# Patient Record
Sex: FEMALE | Race: OTHER | HISPANIC OR LATINO | URBAN - METROPOLITAN AREA
[De-identification: names, ages, dates, MRNs, and addresses within clinical notes are randomized per-mention and may not be internally consistent; named-entity substitution may affect disease eponyms.]

---

## 2021-12-13 ENCOUNTER — INPATIENT (INPATIENT)
Facility: HOSPITAL | Age: 56
LOS: 2 days | Discharge: HOME | End: 2021-12-16
Attending: STUDENT IN AN ORGANIZED HEALTH CARE EDUCATION/TRAINING PROGRAM | Admitting: STUDENT IN AN ORGANIZED HEALTH CARE EDUCATION/TRAINING PROGRAM
Payer: MEDICAID

## 2021-12-13 VITALS
RESPIRATION RATE: 18 BRPM | OXYGEN SATURATION: 99 % | TEMPERATURE: 98 F | SYSTOLIC BLOOD PRESSURE: 131 MMHG | DIASTOLIC BLOOD PRESSURE: 62 MMHG | HEART RATE: 88 BPM

## 2021-12-13 LAB — SARS-COV-2 RNA SPEC QL NAA+PROBE: SIGNIFICANT CHANGE UP

## 2021-12-13 PROCEDURE — 71046 X-RAY EXAM CHEST 2 VIEWS: CPT | Mod: 26

## 2021-12-13 PROCEDURE — 93010 ELECTROCARDIOGRAM REPORT: CPT

## 2021-12-13 PROCEDURE — 99220: CPT

## 2021-12-13 RX ORDER — ACETAMINOPHEN 500 MG
650 TABLET ORAL ONCE
Refills: 0 | Status: COMPLETED | OUTPATIENT
Start: 2021-12-13 | End: 2021-12-13

## 2021-12-13 RX ORDER — ASPIRIN/CALCIUM CARB/MAGNESIUM 324 MG
324 TABLET ORAL ONCE
Refills: 0 | Status: COMPLETED | OUTPATIENT
Start: 2021-12-13 | End: 2021-12-13

## 2021-12-13 RX ORDER — SODIUM CHLORIDE 9 MG/ML
1000 INJECTION INTRAMUSCULAR; INTRAVENOUS; SUBCUTANEOUS ONCE
Refills: 0 | Status: COMPLETED | OUTPATIENT
Start: 2021-12-13 | End: 2021-12-13

## 2021-12-13 RX ADMIN — Medication 324 MILLIGRAM(S): at 20:23

## 2021-12-13 RX ADMIN — Medication 650 MILLIGRAM(S): at 20:24

## 2021-12-13 RX ADMIN — SODIUM CHLORIDE 1000 MILLILITER(S): 9 INJECTION INTRAMUSCULAR; INTRAVENOUS; SUBCUTANEOUS at 21:25

## 2021-12-13 NOTE — ED PROVIDER NOTE - NSICDXFAMILYHX_GEN_ALL_CORE_FT
FAMILY HISTORY:  Father  Still living? Unknown  Family history of coronary artery disease, Age at diagnosis: 71-80

## 2021-12-13 NOTE — ED PROVIDER NOTE - ATTENDING CONTRIBUTION TO CARE
Declines , uses son at bedside.  56yoF with h/o HTN, HLD, migraines, presents with multiple complaints. Chief complaint is that she has been feeling her heart beating fast x 2-3 days, not currently. Some mild SOB this AM x2 hrs while at rest, not currently. Initially son was reporting CP but pt denies having had this at all. Also having generalized HA, currently only mild and entirely c/w her past migraines. On ROS some leg cramping but is chronic, some heartburn feeling in her epigastric region when she takes pills which is chronic, nausea. Nonsmoker. +FHx of CAD, has not had recent cardiac w/u. Denies vision changes, fever, cough, vomiting, urinary symptoms, and all other symptoms. On exam, afebrile, hemodynamically stable, saturating well on RA, NAD, well appearing, sitting comfortably in chair, no WOB, speaking full sentences, head NCAT, EOMI grossly, anicteric, MMM, no JVD, RRR, nml S1/S2, no m/r/g, lungs CTAB, no w/r/r, abd soft, NT, ND, nml BS, no rebound or guarding, AAO, CN's 3-12 intact, motor 5/5 and sens symm in all extrems, ACUNA spontaneously, no leg cyanosis or edema, 2+ symm radial pulses, skin warm, well perfused, no rashes or hives. Character low suspicion for dissection and no murmur or pulse asymmetry. Character low suspicion for PE and no tachycardia, hypoxia, or e/o DVT and D-dimer ________________. No e/o PNA, PTX, or fluid overload on exam or CXR. Character low suspicion for ACS and ECG/trop unremarkable. No abd pain/tenderness. No urinary symptoms. HA currently mild and c/w past migraines. No e/o elevated ICP, glaucoma, temporal arteritis, meningitis. No focal or localizing findings to suggest CVA or dissection. Declines , uses son at bedside.  56yoF with h/o HTN, HLD, migraines, presents with multiple complaints. Chief complaint is that she has been feeling her heart beating fast x 2-3 days, not currently. Some mild SOB this AM x2 hrs while at rest, not currently. Initially son was reporting CP but pt denies having had this at all. Also having generalized HA, currently only mild and entirely c/w her past migraines. On ROS some leg cramping but is chronic, some heartburn feeling in her epigastric region when she takes pills which is chronic, nausea. Nonsmoker. +FHx of CAD, has not had recent cardiac w/u. Denies vision changes, fever, cough, vomiting, urinary symptoms, and all other symptoms. On exam, afebrile, hemodynamically stable, saturating well on RA, NAD, well appearing, sitting comfortably in chair, no WOB, speaking full sentences, head NCAT, EOMI grossly, anicteric, MMM, no JVD, RRR, nml S1/S2, no m/r/g, lungs CTAB, no w/r/r, abd soft, NT, ND, nml BS, no rebound or guarding, AAO, CN's 3-12 intact, motor 5/5 and sens symm in all extrems, ACUNA spontaneously, no leg cyanosis or edema, 2+ symm radial pulses, skin warm, well perfused, no rashes or hives. Character low suspicion for dissection and no murmur or pulse asymmetry. Character low suspicion for PE and no tachycardia, hypoxia, or e/o DVT and D-dimer negative. No e/o PNA, PTX, or fluid overload on exam or CXR. Character low suspicion for ACS and ECG/trop unremarkable. No abd pain/tenderness. No urinary symptoms. HA currently mild and c/w past migraines. No e/o elevated ICP, glaucoma, temporal arteritis, meningitis. No focal or localizing findings to suggest CVA or dissection. Patient is well appearing, NAD, afebrile, hemodynamically stable. Given ASA, fluids. Sent to obs for ACS w/u.

## 2021-12-13 NOTE — ED PROVIDER NOTE - OBJECTIVE STATEMENT
57 yo female hx of HTN/HLD present c/o left sided chest pain radiating to her back/left arm off/on today with diaphoresis/sob and generalized weakness earlier today so she came to ED for evaluation. also reported posterior headache which is mild. headache is aching like. Patient just arrived from [place] about a week ago. denies hx of cardiac testing.   Denies exogenous hormone use/recent hospitalization/hx of DVT. denies recent illness/fever/chill/HA/dizziness/sob/abd pain/n/v/d/urinary sxs.

## 2021-12-13 NOTE — ED ADULT NURSE NOTE - CHPI ED NUR SYMPTOMS POS
headache/NAUSEA/PALPITATIONS/SHORTNESS OF BREATH headache; weakness; diaphoresis/CHEST PAIN/SHORTNESS OF BREATH

## 2021-12-13 NOTE — ED ADULT NURSE NOTE - OBJECTIVE STATEMENT
Pt. presents to ED with headache, feeling of heart racing fast x2-3 days & SOB this AM x2 hours. Denies chest pain, heart racing or SOB at this time. States headache consistent with previous migraines. Pt. presents to ED with chest pain radiating down left arm & back with associated SOB, diaphoresis and weakness. Also c/o headache but states c/t previous migraines.

## 2021-12-13 NOTE — ED ADULT NURSE NOTE - CHPI ED NUR SYMPTOMS NEG
no back pain/no chest pain/no chills/no congestion/no dizziness/no fever/no syncope/no vomiting no chills/no congestion/no dizziness/no fever/no syncope/no vomiting

## 2021-12-13 NOTE — ED PROVIDER NOTE - CHILD ABUSE FACILITY
Patient called writer with a few questions. Patient had questions regarding her coming on her menses on whether the surgery will be canceled. Writer informed patient surgery will not be cancelled however she can inform the SDS RN of her mesning and she can apply 2 maxi pads that sits from the lower pelvic area to the buttocks to ensure she does not mess herself. Patient voiced verbal understanding    Writer also discussed with patient what time to arrive and were to check in at after patient asked what time to arrive.     Writer discussed if patient is being dropped off to be dropped of at the main entrance of the hospital    Writer also clarified patient will be staying overnight for observation. Patient verbalized understanding   [facility]

## 2021-12-13 NOTE — ED PROVIDER NOTE - CLINICAL SUMMARY MEDICAL DECISION MAKING FREE TEXT BOX
Declines , uses son at bedside.  56yoF with h/o HTN, HLD, migraines, presents with multiple complaints. Chief complaint is that she has been feeling her heart beating fast x 2-3 days, not currently. Some mild SOB this AM x2 hrs while at rest, not currently. Initially son was reporting CP but pt denies having had this at all. Also having generalized HA, currently only mild and entirely c/w her past migraines. On ROS some leg cramping but is chronic, some heartburn feeling in her epigastric region when she takes pills which is chronic, nausea. Nonsmoker. +FHx of CAD, has not had recent cardiac w/u. Denies vision changes, fever, cough, vomiting, urinary symptoms, and all other symptoms. On exam, afebrile, hemodynamically stable, saturating well on RA, NAD, well appearing, sitting comfortably in chair, no WOB, speaking full sentences, head NCAT, EOMI grossly, anicteric, MMM, no JVD, RRR, nml S1/S2, no m/r/g, lungs CTAB, no w/r/r, abd soft, NT, ND, nml BS, no rebound or guarding, AAO, CN's 3-12 intact, motor 5/5 and sens symm in all extrems, ACUNA spontaneously, no leg cyanosis or edema, 2+ symm radial pulses, skin warm, well perfused, no rashes or hives. Character low suspicion for dissection and no murmur or pulse asymmetry. Character low suspicion for PE and no tachycardia, hypoxia, or e/o DVT and D-dimer negative. No e/o PNA, PTX, or fluid overload on exam or CXR. Character low suspicion for ACS and ECG/trop unremarkable. No abd pain/tenderness. No urinary symptoms. HA currently mild and c/w past migraines. No e/o elevated ICP, glaucoma, temporal arteritis, meningitis. No focal or localizing findings to suggest CVA or dissection. Patient is well appearing, NAD, afebrile, hemodynamically stable. Given ASA, fluids. Sent to obs for ACS w/u.

## 2021-12-13 NOTE — ED ADULT NURSE NOTE - NSFALLRSKOUTCOME_ED_ALL_ED
Gestational age 37 or more weeks  Gestational Diabetes  Herniated nucleus pulposus    PCOS (polycystic ovarian syndrome)
Universal Safety Interventions

## 2021-12-14 LAB — TROPONIN T SERPL-MCNC: <0.01 NG/ML — SIGNIFICANT CHANGE UP

## 2021-12-14 PROCEDURE — G1004: CPT

## 2021-12-14 PROCEDURE — 75574 CT ANGIO HRT W/3D IMAGE: CPT | Mod: 26,MF

## 2021-12-14 PROCEDURE — 93010 ELECTROCARDIOGRAM REPORT: CPT

## 2021-12-14 PROCEDURE — 99217: CPT

## 2021-12-14 RX ORDER — ASPIRIN/CALCIUM CARB/MAGNESIUM 324 MG
81 TABLET ORAL DAILY
Refills: 0 | Status: DISCONTINUED | OUTPATIENT
Start: 2021-12-14 | End: 2021-12-16

## 2021-12-14 RX ORDER — ATORVASTATIN CALCIUM 80 MG/1
40 TABLET, FILM COATED ORAL AT BEDTIME
Refills: 0 | Status: DISCONTINUED | OUTPATIENT
Start: 2021-12-14 | End: 2021-12-14

## 2021-12-14 RX ORDER — METOPROLOL TARTRATE 50 MG
100 TABLET ORAL ONCE
Refills: 0 | Status: COMPLETED | OUTPATIENT
Start: 2021-12-14 | End: 2021-12-14

## 2021-12-14 RX ORDER — LOSARTAN POTASSIUM 100 MG/1
50 TABLET, FILM COATED ORAL DAILY
Refills: 0 | Status: DISCONTINUED | OUTPATIENT
Start: 2021-12-14 | End: 2021-12-16

## 2021-12-14 RX ORDER — CHLORHEXIDINE GLUCONATE 213 G/1000ML
1 SOLUTION TOPICAL
Refills: 0 | Status: DISCONTINUED | OUTPATIENT
Start: 2021-12-14 | End: 2021-12-16

## 2021-12-14 RX ORDER — METOPROLOL TARTRATE 50 MG
12.5 TABLET ORAL
Refills: 0 | Status: DISCONTINUED | OUTPATIENT
Start: 2021-12-14 | End: 2021-12-16

## 2021-12-14 RX ORDER — SODIUM CHLORIDE 9 MG/ML
1000 INJECTION, SOLUTION INTRAVENOUS ONCE
Refills: 0 | Status: COMPLETED | OUTPATIENT
Start: 2021-12-14 | End: 2021-12-14

## 2021-12-14 RX ORDER — INFLUENZA VIRUS VACCINE 15; 15; 15; 15 UG/.5ML; UG/.5ML; UG/.5ML; UG/.5ML
0.5 SUSPENSION INTRAMUSCULAR ONCE
Refills: 0 | Status: DISCONTINUED | OUTPATIENT
Start: 2021-12-14 | End: 2021-12-16

## 2021-12-14 RX ORDER — METOPROLOL TARTRATE 50 MG
50 TABLET ORAL ONCE
Refills: 0 | Status: DISCONTINUED | OUTPATIENT
Start: 2021-12-14 | End: 2021-12-14

## 2021-12-14 RX ORDER — ENOXAPARIN SODIUM 100 MG/ML
40 INJECTION SUBCUTANEOUS AT BEDTIME
Refills: 0 | Status: DISCONTINUED | OUTPATIENT
Start: 2021-12-14 | End: 2021-12-15

## 2021-12-14 RX ORDER — ATORVASTATIN CALCIUM 80 MG/1
80 TABLET, FILM COATED ORAL AT BEDTIME
Refills: 0 | Status: DISCONTINUED | OUTPATIENT
Start: 2021-12-14 | End: 2021-12-16

## 2021-12-14 RX ADMIN — ENOXAPARIN SODIUM 40 MILLIGRAM(S): 100 INJECTION SUBCUTANEOUS at 21:42

## 2021-12-14 RX ADMIN — Medication 81 MILLIGRAM(S): at 17:20

## 2021-12-14 RX ADMIN — SODIUM CHLORIDE 1000 MILLILITER(S): 9 INJECTION, SOLUTION INTRAVENOUS at 06:46

## 2021-12-14 RX ADMIN — Medication 100 MILLIGRAM(S): at 08:48

## 2021-12-14 RX ADMIN — ATORVASTATIN CALCIUM 80 MILLIGRAM(S): 80 TABLET, FILM COATED ORAL at 21:42

## 2021-12-14 NOTE — ED CDU PROVIDER INITIAL DAY NOTE - OBJECTIVE STATEMENT
55 yo F with PMHx of HTN and HLD presents to the ED c/o mild left sided chest pain that started earlier today. Pain is intermittent, sharp, radiates into her back/left arm and is associated with SOB/diaphoresis/generalized weakness/mild posterior headache. Pt recently came from [place] about a week ago. She denies other complaints. She has never had cardiac workup. She is non-smoker. Pt denies fever, chills, nausea, vomiting, abdominal pain, diarrhea, dizziness, back pain, LOC, trauma, urinary symptoms, cough, calf pain/swelling, recent surgery.

## 2021-12-14 NOTE — ED CDU PROVIDER SUBSEQUENT DAY NOTE - PROGRESS NOTE DETAILS
Pt resting comfortably, denies any complaints. Will continue to monitor. CCTA shows CAD RADS 3/4a, called cardiology, would like to admit to cardio tele

## 2021-12-14 NOTE — H&P ADULT - HISTORY OF PRESENT ILLNESS
57 yo Mongolian speaking F with PMHx of HLD and HTN presents to the ED for chest pain. She is from [place] and all her doctors are in [place]. yesterday she was lying down when she felt palpitations that lasted two hours. She has had these palpitations before, was seen by a cardiologist who checked her EKG and lipid profile and started her on medications. Yesterday the palpitations were associated with lightheadedness and midsternal chest pain. Pain did not radiate anywhere and resolved within two hours. Pain is worse with respiration when she takes a deep breath.   She denies any fevers, URI symptoms, LE swelling, h/o cardiac cath or any workup.    Pt stayed in ED obs overnight. VS within normal limits, labs within normal limits, EKG and CXR unremarkable, trop neg x2, CCTA shows CAD-RADS 3/4A. Received asa and metoprolol 100mg.

## 2021-12-14 NOTE — ED CDU PROVIDER INITIAL DAY NOTE - NS ED ROS FT
Review of Systems  Constitutional:  No fever, chills.  Eyes:  No visual changes, eye pain, or discharge.  ENMT:  No hearing changes, pain, or discharge. No nasal congestion, discharge, or bleeding. No throat pain, swelling, or difficulty swallowing.  Cardiac:  No palpitations, syncope, or edema. (+) chest pain  Respiratory:  No dyspnea, cough. No hemoptysis.  GI:  No nausea, vomiting, diarrhea, or abdominal pain.   :  No dysuria, hematuria, frequency, or burning.   MS:  No back pain.  Skin:  No skin rash, pruritis, jaundice, or lesions.  Neuro:  No dizziness, loss of sensation, or focal weakness.  No change in mental status. (+) headache

## 2021-12-14 NOTE — H&P ADULT - TIME BILLING
25 minute bedside evaluation in Occitan with patient and her two relatives  Care coordination with the Cath Lab

## 2021-12-14 NOTE — ED CDU PROVIDER SUBSEQUENT DAY NOTE - MEDICAL DECISION MAKING DETAILS
Patient presented with chest pain - initial work up in ED negative. Placed in obs for further ACS rule out. Serial trops negative. Awaiting results of CCTA. NAD at this time. Will re-eval pending results.

## 2021-12-14 NOTE — H&P ADULT - ATTENDING COMMENTS
Ms. Horner is a 55yo Arabic-speaking F who resides in [place] and has a history of HTN and HLD who presented to the ED with chest pain.     Per the patient, ~1.5 years ago, she had an episode of feeling unwell and fatigued with associated chest pain, heart racing, and dyspnea. She went to a local clinic in [place], where her ECG and lipid panel were checked. She was told she had a "srini-infarct" and was given medications to reduce her cholesterol. Since then, patient has had stable dyspnea on exertion, only able to walk 15 minutes before having to catch her breath. She also has recurrent episodes of chest pain, up to once per week, that occur with rest or with exertion.     Patient presented to the ED because she had one of her chest pain episodes, but it was worse than normal. Troponin negative x 3. CCTA with moderate-severe narrowing of the p-mLAD. Plan for LHC.     Of note, patient's recurrent chest pain episodes may also be related to abnormal heart rhythms as patient also describes her heart racing and beating strongly. Patient and family is aware that we will first investigate CAD and if she has ongoing symptoms, we will need to rule out arrhythmias.     Plan:  - Patient to remain NPO  - LHC today  - s/p  mg  - Continue ASA 81 mg and atorvastatin 80 mg  - Start Lopressor 12.5 mg BID  - Continue home losartan, unsure of dose, on 50 mg daily   - TTE today  - A1c 5.7, , TSH pending

## 2021-12-14 NOTE — ED CDU PROVIDER INITIAL DAY NOTE - PHYSICAL EXAMINATION
VITAL SIGNS: I have reviewed nursing notes and confirm.  CONSTITUTIONAL: Well-developed; well-nourished; in no acute distress.  SKIN: Skin exam is warm and dry, no acute rash.  HEAD: Normocephalic; atraumatic.  EYES: PERRL, EOM intact; conjunctiva and sclera clear.  ENT: No nasal discharge; airway clear.   CARD: S1, S2 normal; no murmurs, gallops, or rubs. Regular rate and rhythm.  RESP: No wheezes, rales or rhonchi. Speaking in full sentences.   ABD: Normal bowel sounds; soft; non-distended; non-tender; No rebound or guarding. No CVA tenderness.  EXT: Normal ROM. No clubbing, cyanosis or edema. No calf TTP or swelling.   NEURO: Alert, oriented. Grossly unremarkable. No focal deficits. CN II-XII intact. No dysmetria. No ataxia. Sensation intact and equal throughout. Strength 5/5 throughout. Gait steady.

## 2021-12-14 NOTE — ED CDU PROVIDER DISPOSITION NOTE - CLINICAL COURSE
Patient presented with chest pain - initial work up in ED negative. Placed in obs for further ACS rule out. In obs, serial trops negative. However CCTA showed CAD-RAD score of 3/4. Consulted cardiology who agrees with admission to cardiac tele for further management. Patient agreeable with plan. HD stable at time of admission.

## 2021-12-14 NOTE — H&P ADULT - NSHPPHYSICALEXAM_GEN_ALL_CORE
PHYSICAL EXAM:  GENERAL: NAD, well-groomed, well-developed  HEAD:  Atraumatic, Normocephalic  EYES: EOMI, conjunctiva and sclera clear  NECK: Supple, No JVD, Normal thyroid  HEART: Regular rate and rhythm; No murmurs, rubs, or gallops  RESPIRATORY: CTA B/L, No W/R/R  ABDOMEN: Soft, Nontender, Nondistended;   NEUROLOGY: A&Ox3, no gross neurological deficits   EXTREMITIES: No clubbing, cyanosis, or edema

## 2021-12-14 NOTE — ED ADULT NURSE REASSESSMENT NOTE - NS ED NURSE REASSESS COMMENT FT1
pt is awake, A&Ox4, denies pain. awaiting CCTA. 18g PIV placed, metoprolol given. cardiac monitoring in progress. will continue to monitor

## 2021-12-14 NOTE — PATIENT PROFILE ADULT - MONEY FOR FOOD
[facility]  IMMUNIZATION CERTIFICATE  (Required of each child enrolled in a public or private school,  program, day care center, certified family  home, or other licensed facility which cares for children.)     Name:  Fanta Segundo  YOB: 2000  Address:  [address] [place] [id number]  -------------------------------------------------------------------------------------------------------------------  Immunization History   Administered Date(s) Administered    DTaP 03/12/2002, 01/16/2003, 07/17/2003    HPV Gardasil 9-valent 05/11/2018    Hepatitis A Ped/Adol (Vaqta) 01/29/2018, 08/08/2018    Hepatitis B, unspecified formulation 03/12/2002, 01/16/2003    Hib, unspecified formulation 03/12/2002, 01/16/2003    IPV (Ipol) 03/12/2002, 01/16/2003    Influenza, Pepper Carver, 3 Years and older, IM 01/29/2018    MMR 01/16/2003    Meningococcal MCV4P (Menactra) 01/29/2018    Pneumococcal Conjugate 7-valent 11/19/2002    Varicella (Varivax) 01/16/2003      -------------------------------------------------------------------------------------------------------------------  *DTaP, DTP, DT, Td   *MMR  for one dose, measles-containing for second. *Hib not required at age 11 years or more. ** Alternative two dose series of approved  adult hepatitis B vaccine for  children 615 years of age. **Varicella  required for children 19 months to 7 years unless a parent, guardian or physician states that the child has had chickenpox disease. This child is current for immunizations until ____/____/____, (two weeks after the next shot is due)  after which this certificate is no longer valid and a new certificate must be obtained. I CERTIFY THAT THE ABOVE NAMED CHILD HAS RECEIVED IMMUNIZATIONS AS STIPULATED ABOVE.   Signature of provider___________________________________________Date_______________ no

## 2021-12-14 NOTE — PATIENT PROFILE ADULT - FALL HARM RISK - HARM RISK INTERVENTIONS
Assistance with ambulation/Communicate Risk of Fall with Harm to all staff/Monitor for mental status changes/Monitor gait and stability/Orthostatic vital signs/Reinforce activity limits and safety measures with patient and family/Tailored Fall Risk Interventions/Use of alarms - bed, chair and/or voice tab/Visual Cue: Yellow wristband and red socks/Bed in lowest position, wheels locked, appropriate side rails in place/Call bell, personal items and telephone in reach/Instruct patient to call for assistance before getting out of bed or chair/Non-slip footwear when patient is out of bed/[place] to call system/Physically safe environment - no spills, clutter or unnecessary equipment/Purposeful Proactive Rounding/Room/bathroom lighting operational, light cord in reach

## 2021-12-14 NOTE — H&P ADULT - NSHPLABSRESULTS_GEN_ALL_CORE
14.3   7.73  )-----------( 316      ( 13 Dec 2021 20:41 )             43.5       12-13    140  |  103  |  16  ----------------------------<  96  4.2   |  21  |  0.5<L>    Ca    9.8      13 Dec 2021 20:41    TPro  7.4  /  Alb  4.7  /  TBili  0.2  /  DBili  x   /  AST  22  /  ALT  17  /  AlkPhos  145<H>  12-13          < from: 12 Lead ECG (12.14.21 @ 02:44) >    Ventricular Rate 65 BPM    Atrial Rate 65 BPM    P-R Interval 160 ms    QRS Duration 70 ms    Q-T Interval 382 ms    QTC Calculation(Bazett) 397 ms    P Axis 49 degrees    R Axis 51 degrees    T Axis 49 degrees    Diagnosis Line Normal sinus rhythm  Low voltage QRS  Cannot rule out Anterior infarct , age undetermined  Abnormal ECG    < end of copied text >    < from: Xray Chest 2 Views PA/Lat (12.13.21 @ 22:07) >    Impression:    No radiographic evidence of acute cardiopulmonary disease.    < end of copied text >                Lactate Trend      CARDIAC MARKERS ( 14 Dec 2021 02:00 )  x     / <0.01 ng/mL / x     / x     / x      CARDIAC MARKERS ( 13 Dec 2021 20:41 )  x     / <0.01 ng/mL / x     / x     / x            CAPILLARY BLOOD GLUCOSE

## 2021-12-14 NOTE — H&P ADULT - ASSESSMENT
57 yo Maltese speaking F with PMHx of HLD and HTN presents to the ED for chest pain associated with palpitations.    #Atypical chest pain with palpitations   Pleuritic in nature, started at rest. D-dimer normal.   Trop <0.01 x2  EKG shows no acute ischemic changes  +FHx of CAD in father   CCTA shows mod-severe stenosis in LAD   Currently chest pain free  - c/w asa, statin for now   - check lipid profile, a1c, tsh, AM EKG, AM trop, ESR, CRP   - TTE   - monitor on tele for any signs of arrhythmia   - c/w losartan   - Cardiology to evaluate the patient.     #Misc  - DVT Prophylaxis: lovenox  - GI Prophylaxis: not indicated   - Diet: DASH    - Activity: AAT  - Code Status: full  55 yo Serbian speaking F with PMHx of HLD and HTN presents to the ED for chest pain associated with palpitations.    #Atypical chest pain with palpitations   Pleuritic in nature, started at rest. D-dimer normal.   Trop <0.01 x2  EKG shows no acute ischemic changes  +FHx of CAD in father   CCTA shows mod-severe stenosis in LAD   Currently chest pain free  - c/w asa, statin for now, will add lopressor 12.5mg BID   - check lipid profile, a1c, tsh, AM EKG, AM trop, ESR, CRP   - TTE   - monitor on tele for any signs of arrhythmia   - c/w losartan   - Cardiology to evaluate the patient.     #Misc  - DVT Prophylaxis: lovenox  - GI Prophylaxis: not indicated   - Diet: DASH    - Activity: AAT  - Code Status: full

## 2021-12-15 LAB
A1C WITH ESTIMATED AVERAGE GLUCOSE RESULT: 5.7 % — HIGH (ref 4–5.6)
ALBUMIN SERPL ELPH-MCNC: 4 G/DL — SIGNIFICANT CHANGE UP (ref 3.5–5.2)
ALP SERPL-CCNC: 110 U/L — SIGNIFICANT CHANGE UP (ref 30–115)
ALT FLD-CCNC: 16 U/L — SIGNIFICANT CHANGE UP (ref 0–41)
ANION GAP SERPL CALC-SCNC: 14 MMOL/L — SIGNIFICANT CHANGE UP (ref 7–14)
AST SERPL-CCNC: 19 U/L — SIGNIFICANT CHANGE UP (ref 0–41)
BASOPHILS # BLD AUTO: 0.02 K/UL — SIGNIFICANT CHANGE UP (ref 0–0.2)
BASOPHILS NFR BLD AUTO: 0.3 % — SIGNIFICANT CHANGE UP (ref 0–1)
BILIRUB SERPL-MCNC: 0.3 MG/DL — SIGNIFICANT CHANGE UP (ref 0.2–1.2)
BUN SERPL-MCNC: 11 MG/DL — SIGNIFICANT CHANGE UP (ref 10–20)
CALCIUM SERPL-MCNC: 9.2 MG/DL — SIGNIFICANT CHANGE UP (ref 8.5–10.1)
CHLORIDE SERPL-SCNC: 108 MMOL/L — SIGNIFICANT CHANGE UP (ref 98–110)
CHOLEST SERPL-MCNC: 192 MG/DL — SIGNIFICANT CHANGE UP
CO2 SERPL-SCNC: 21 MMOL/L — SIGNIFICANT CHANGE UP (ref 17–32)
CREAT SERPL-MCNC: 0.5 MG/DL — LOW (ref 0.7–1.5)
CRP SERPL-MCNC: <3 MG/L — SIGNIFICANT CHANGE UP
EOSINOPHIL # BLD AUTO: 0.14 K/UL — SIGNIFICANT CHANGE UP (ref 0–0.7)
EOSINOPHIL NFR BLD AUTO: 2 % — SIGNIFICANT CHANGE UP (ref 0–8)
ERYTHROCYTE [SEDIMENTATION RATE] IN BLOOD: 12 MM/HR — SIGNIFICANT CHANGE UP (ref 0–20)
ESTIMATED AVERAGE GLUCOSE: 117 MG/DL — HIGH (ref 68–114)
GLUCOSE SERPL-MCNC: 87 MG/DL — SIGNIFICANT CHANGE UP (ref 70–99)
HCT VFR BLD CALC: 41.8 % — SIGNIFICANT CHANGE UP (ref 37–47)
HDLC SERPL-MCNC: 41 MG/DL — LOW
HGB BLD-MCNC: 14 G/DL — SIGNIFICANT CHANGE UP (ref 12–16)
IMM GRANULOCYTES NFR BLD AUTO: 0.1 % — SIGNIFICANT CHANGE UP (ref 0.1–0.3)
LIPID PNL WITH DIRECT LDL SERPL: 117 MG/DL — HIGH
LYMPHOCYTES # BLD AUTO: 3.34 K/UL — SIGNIFICANT CHANGE UP (ref 1.2–3.4)
LYMPHOCYTES # BLD AUTO: 47.9 % — SIGNIFICANT CHANGE UP (ref 20.5–51.1)
MAGNESIUM SERPL-MCNC: 1.9 MG/DL — SIGNIFICANT CHANGE UP (ref 1.8–2.4)
MCHC RBC-ENTMCNC: 29.7 PG — SIGNIFICANT CHANGE UP (ref 27–31)
MCHC RBC-ENTMCNC: 33.5 G/DL — SIGNIFICANT CHANGE UP (ref 32–37)
MCV RBC AUTO: 88.6 FL — SIGNIFICANT CHANGE UP (ref 81–99)
MONOCYTES # BLD AUTO: 0.5 K/UL — SIGNIFICANT CHANGE UP (ref 0.1–0.6)
MONOCYTES NFR BLD AUTO: 7.2 % — SIGNIFICANT CHANGE UP (ref 1.7–9.3)
NEUTROPHILS # BLD AUTO: 2.97 K/UL — SIGNIFICANT CHANGE UP (ref 1.4–6.5)
NEUTROPHILS NFR BLD AUTO: 42.5 % — SIGNIFICANT CHANGE UP (ref 42.2–75.2)
NON HDL CHOLESTEROL: 151 MG/DL — HIGH
NRBC # BLD: 0 /100 WBCS — SIGNIFICANT CHANGE UP (ref 0–0)
PLATELET # BLD AUTO: 306 K/UL — SIGNIFICANT CHANGE UP (ref 130–400)
POTASSIUM SERPL-MCNC: 3.7 MMOL/L — SIGNIFICANT CHANGE UP (ref 3.5–5)
POTASSIUM SERPL-SCNC: 3.7 MMOL/L — SIGNIFICANT CHANGE UP (ref 3.5–5)
PROT SERPL-MCNC: 6.3 G/DL — SIGNIFICANT CHANGE UP (ref 6–8)
RBC # BLD: 4.72 M/UL — SIGNIFICANT CHANGE UP (ref 4.2–5.4)
RBC # FLD: 11.7 % — SIGNIFICANT CHANGE UP (ref 11.5–14.5)
SODIUM SERPL-SCNC: 143 MMOL/L — SIGNIFICANT CHANGE UP (ref 135–146)
TRIGL SERPL-MCNC: 226 MG/DL — HIGH
TROPONIN T SERPL-MCNC: <0.01 NG/ML — SIGNIFICANT CHANGE UP
TSH SERPL-MCNC: 1.65 UIU/ML — SIGNIFICANT CHANGE UP (ref 0.27–4.2)
WBC # BLD: 6.98 K/UL — SIGNIFICANT CHANGE UP (ref 4.8–10.8)
WBC # FLD AUTO: 6.98 K/UL — SIGNIFICANT CHANGE UP (ref 4.8–10.8)

## 2021-12-15 PROCEDURE — 93458 L HRT ARTERY/VENTRICLE ANGIO: CPT | Mod: 26

## 2021-12-15 PROCEDURE — 99222 1ST HOSP IP/OBS MODERATE 55: CPT

## 2021-12-15 PROCEDURE — 93306 TTE W/DOPPLER COMPLETE: CPT | Mod: 26

## 2021-12-15 RX ORDER — MAGNESIUM SULFATE 500 MG/ML
2 VIAL (ML) INJECTION ONCE
Refills: 0 | Status: COMPLETED | OUTPATIENT
Start: 2021-12-15 | End: 2021-12-15

## 2021-12-15 RX ORDER — ACETAMINOPHEN 500 MG
650 TABLET ORAL EVERY 6 HOURS
Refills: 0 | Status: DISCONTINUED | OUTPATIENT
Start: 2021-12-15 | End: 2021-12-16

## 2021-12-15 RX ORDER — SODIUM CHLORIDE 9 MG/ML
1000 INJECTION INTRAMUSCULAR; INTRAVENOUS; SUBCUTANEOUS
Refills: 0 | Status: DISCONTINUED | OUTPATIENT
Start: 2021-12-15 | End: 2021-12-16

## 2021-12-15 RX ORDER — POTASSIUM CHLORIDE 20 MEQ
20 PACKET (EA) ORAL ONCE
Refills: 0 | Status: COMPLETED | OUTPATIENT
Start: 2021-12-15 | End: 2021-12-15

## 2021-12-15 RX ADMIN — Medication 650 MILLIGRAM(S): at 22:00

## 2021-12-15 RX ADMIN — ATORVASTATIN CALCIUM 80 MILLIGRAM(S): 80 TABLET, FILM COATED ORAL at 22:39

## 2021-12-15 RX ADMIN — Medication 25 GRAM(S): at 20:14

## 2021-12-15 RX ADMIN — Medication 12.5 MILLIGRAM(S): at 22:40

## 2021-12-15 RX ADMIN — Medication 650 MILLIGRAM(S): at 21:30

## 2021-12-15 RX ADMIN — CHLORHEXIDINE GLUCONATE 1 APPLICATION(S): 213 SOLUTION TOPICAL at 05:29

## 2021-12-15 RX ADMIN — LOSARTAN POTASSIUM 50 MILLIGRAM(S): 100 TABLET, FILM COATED ORAL at 22:40

## 2021-12-15 RX ADMIN — Medication 50 MILLIEQUIVALENT(S): at 22:40

## 2021-12-15 RX ADMIN — SODIUM CHLORIDE 75 MILLILITER(S): 9 INJECTION INTRAMUSCULAR; INTRAVENOUS; SUBCUTANEOUS at 19:34

## 2021-12-15 RX ADMIN — Medication 12.5 MILLIGRAM(S): at 05:28

## 2021-12-15 RX ADMIN — Medication 81 MILLIGRAM(S): at 11:54

## 2021-12-15 NOTE — PROGRESS NOTE ADULT - ASSESSMENT
57 yo Czech speaking F with PMHx of HLD and HTN presents to the ED for chest pain.    #Atypical chest pain with palpitations     -Pleuritic in nature, started at rest. D-dimer normal.   - Trop <0.01 x2  - EKG shows no acute ischemic changes  - +FHx of CAD in father   - CCTA shows mod-severe stenosis in LAD   - Patient is scheduled to have a cardiac angiogram today  - Asa, atorvastatin, losartan and lopressor 12.5 mg BID  - TTE still pending  - monitor on tele for any signs of arrhythmia     #Misc  - DVT Prophylaxis: lovenox  - GI Prophylaxis: not indicated   - Diet: DASH    - Activity: AAT  - Code Status: full

## 2021-12-15 NOTE — PROGRESS NOTE ADULT - SUBJECTIVE AND OBJECTIVE BOX
SUBJECTIVE:    Patient is a 56y old Female who presents with a chief complaint of Chest pain (14 Dec 2021 18:00)    Overnight Events: No overnight events. Patient reports that she still has the chest pain, no palpitations, no cough, no dyspnea.    PAST MEDICAL & SURGICAL HISTORY  HTN (hypertension)    Migraines    HLD (hyperlipidemia)    No significant past surgical history      SOCIAL HISTORY:  Negative for smoking/alcohol/drug use.     ALLERGIES:  No Known Allergies    MEDICATIONS:  STANDING MEDICATIONS  aspirin  chewable 81 milliGRAM(s) Oral daily  atorvastatin 80 milliGRAM(s) Oral at bedtime  chlorhexidine 4% Liquid 1 Application(s) Topical <User Schedule>  enoxaparin Injectable 40 milliGRAM(s) SubCutaneous at bedtime  influenza   Vaccine 0.5 milliLiter(s) IntraMuscular once  losartan 50 milliGRAM(s) Oral daily  metoprolol tartrate 12.5 milliGRAM(s) Oral two times a day    PRN MEDICATIONS    VITALS:   T(F): 97.6, Max: 97.6 (12-15-21 @ 13:40)  HR: 64 (64 - 74)  BP: 112/58 (112/58 - 129/58)  RR: 18 (17 - 18)  SpO2: 97% (97% - 97%)    LABS:                        14.0   6.98  )-----------( 306      ( 15 Dec 2021 06:00 )             41.8     12-15    143  |  108  |  11  ----------------------------<  87  3.7   |  21  |  0.5<L>    Ca    9.2      15 Dec 2021 06:00  Mg     1.9     12-15    TPro  6.3  /  Alb  4.0  /  TBili  0.3  /  DBili  x   /  AST  19  /  ALT  16  /  AlkPhos  110  12-15          Troponin T, Serum: <0.01 ng/mL (12-15-21 @ 06:00)  Sedimentation Rate, Erythrocyte: 12 mm/Hr (12-15-21 @ 06:00)      CARDIAC MARKERS ( 15 Dec 2021 06:00 )  x     / <0.01 ng/mL / x     / x     / x      CARDIAC MARKERS ( 14 Dec 2021 02:00 )  x     / <0.01 ng/mL / x     / x     / x      CARDIAC MARKERS ( 13 Dec 2021 20:41 )  x     / <0.01 ng/mL / x     / x     / x              12-14-21 @ 07:01  -  12-15-21 @ 07:00  --------------------------------------------------------  IN: 260 mL / OUT: 0 mL / NET: 260 mL    12-15-21 @ 07:01  -  12-15-21 @ 15:51  --------------------------------------------------------  IN: 0 mL / OUT: 600 mL / NET: -600 mL          IMAGING/EKG: No imaging    PHYSICAL EXAM:  GEN: NAD, comfortable  LUNGS: CTAB, no w/r/r  HEART: RRR, s1 and s2 appreciated, no m/r/g; mild tenderness when palpating the left side of the chest  ABD: soft, NT/ND, +BS  EXT: no edema, PP b/l  NEURO: AAOX3

## 2021-12-15 NOTE — CHART NOTE - NSCHARTNOTEFT_GEN_A_CORE
PRE-OP DIAGNOSIS:    Abnormal CCTA    PROCEDURE: Coronary angiogram, [facility]      Attending:   Dr. Weldon     Interventional Fellow:  Dr. Funez       Consent:      [x] Patient     [] Family Member     [x]  Used        Anesthesia:     [] General     [x] Sedation     [x] Local        Access & Closure:     [x] 6 Fr right Radial Artery -> D stat      IV Contrast:   40  mL        Intervention: none      Implants: none       FINDINGS:       Coronary Dominance: right       LM: no disease     LAD: large sized. mild atherosclerosis in prox LAD  D1: medium sized. no disease     LCX: medium sized. no disease   OM1: medium sized. no disease       RCA: large, dominant. tubular 30% stenosis in mid RCA   RPDA: no disease   RPL: no disease      LVEDP: normal        ESTIMATED BLOOD LOSS: < 30 mL        CONDITION:     [x] Good     [] Fair     [] Critical        SPECIMEN REMOVED: N/A       POST-OP DIAGNOSIS:    Non obstructive CAD        PLAN OF CARE:   IV NS 75 cc/hr x 4 hrs  Medical therapy and risk factor modification

## 2021-12-16 ENCOUNTER — TRANSCRIPTION ENCOUNTER (OUTPATIENT)
Age: 56
End: 2021-12-16

## 2021-12-16 VITALS
TEMPERATURE: 97 F | SYSTOLIC BLOOD PRESSURE: 124 MMHG | RESPIRATION RATE: 18 BRPM | HEART RATE: 73 BPM | DIASTOLIC BLOOD PRESSURE: 55 MMHG

## 2021-12-16 LAB
ALBUMIN SERPL ELPH-MCNC: 4 G/DL — SIGNIFICANT CHANGE UP (ref 3.5–5.2)
ALP SERPL-CCNC: 111 U/L — SIGNIFICANT CHANGE UP (ref 30–115)
ALT FLD-CCNC: 17 U/L — SIGNIFICANT CHANGE UP (ref 0–41)
ANION GAP SERPL CALC-SCNC: 15 MMOL/L — HIGH (ref 7–14)
AST SERPL-CCNC: 24 U/L — SIGNIFICANT CHANGE UP (ref 0–41)
BASOPHILS # BLD AUTO: 0.03 K/UL — SIGNIFICANT CHANGE UP (ref 0–0.2)
BASOPHILS NFR BLD AUTO: 0.3 % — SIGNIFICANT CHANGE UP (ref 0–1)
BILIRUB SERPL-MCNC: 0.3 MG/DL — SIGNIFICANT CHANGE UP (ref 0.2–1.2)
BUN SERPL-MCNC: 13 MG/DL — SIGNIFICANT CHANGE UP (ref 10–20)
CALCIUM SERPL-MCNC: 9 MG/DL — SIGNIFICANT CHANGE UP (ref 8.5–10.1)
CHLORIDE SERPL-SCNC: 107 MMOL/L — SIGNIFICANT CHANGE UP (ref 98–110)
CO2 SERPL-SCNC: 19 MMOL/L — SIGNIFICANT CHANGE UP (ref 17–32)
CREAT SERPL-MCNC: 0.6 MG/DL — LOW (ref 0.7–1.5)
EOSINOPHIL # BLD AUTO: 0.14 K/UL — SIGNIFICANT CHANGE UP (ref 0–0.7)
EOSINOPHIL NFR BLD AUTO: 1.4 % — SIGNIFICANT CHANGE UP (ref 0–8)
GLUCOSE SERPL-MCNC: 86 MG/DL — SIGNIFICANT CHANGE UP (ref 70–99)
HCT VFR BLD CALC: 39.8 % — SIGNIFICANT CHANGE UP (ref 37–47)
HGB BLD-MCNC: 13.3 G/DL — SIGNIFICANT CHANGE UP (ref 12–16)
IMM GRANULOCYTES NFR BLD AUTO: 0.2 % — SIGNIFICANT CHANGE UP (ref 0.1–0.3)
LYMPHOCYTES # BLD AUTO: 2.59 K/UL — SIGNIFICANT CHANGE UP (ref 1.2–3.4)
LYMPHOCYTES # BLD AUTO: 26.5 % — SIGNIFICANT CHANGE UP (ref 20.5–51.1)
MAGNESIUM SERPL-MCNC: 2.2 MG/DL — SIGNIFICANT CHANGE UP (ref 1.8–2.4)
MCHC RBC-ENTMCNC: 29.6 PG — SIGNIFICANT CHANGE UP (ref 27–31)
MCHC RBC-ENTMCNC: 33.4 G/DL — SIGNIFICANT CHANGE UP (ref 32–37)
MCV RBC AUTO: 88.6 FL — SIGNIFICANT CHANGE UP (ref 81–99)
MONOCYTES # BLD AUTO: 0.69 K/UL — HIGH (ref 0.1–0.6)
MONOCYTES NFR BLD AUTO: 7.1 % — SIGNIFICANT CHANGE UP (ref 1.7–9.3)
NEUTROPHILS # BLD AUTO: 6.31 K/UL — SIGNIFICANT CHANGE UP (ref 1.4–6.5)
NEUTROPHILS NFR BLD AUTO: 64.5 % — SIGNIFICANT CHANGE UP (ref 42.2–75.2)
NRBC # BLD: 0 /100 WBCS — SIGNIFICANT CHANGE UP (ref 0–0)
PLATELET # BLD AUTO: 297 K/UL — SIGNIFICANT CHANGE UP (ref 130–400)
POTASSIUM SERPL-MCNC: 4 MMOL/L — SIGNIFICANT CHANGE UP (ref 3.5–5)
POTASSIUM SERPL-SCNC: 4 MMOL/L — SIGNIFICANT CHANGE UP (ref 3.5–5)
PROT SERPL-MCNC: 6.1 G/DL — SIGNIFICANT CHANGE UP (ref 6–8)
RBC # BLD: 4.49 M/UL — SIGNIFICANT CHANGE UP (ref 4.2–5.4)
RBC # FLD: 11.6 % — SIGNIFICANT CHANGE UP (ref 11.5–14.5)
SODIUM SERPL-SCNC: 141 MMOL/L — SIGNIFICANT CHANGE UP (ref 135–146)
WBC # BLD: 9.78 K/UL — SIGNIFICANT CHANGE UP (ref 4.8–10.8)
WBC # FLD AUTO: 9.78 K/UL — SIGNIFICANT CHANGE UP (ref 4.8–10.8)

## 2021-12-16 PROCEDURE — 99239 HOSP IP/OBS DSCHRG MGMT >30: CPT

## 2021-12-16 RX ORDER — ASPIRIN/CALCIUM CARB/MAGNESIUM 324 MG
1 TABLET ORAL
Qty: 30 | Refills: 0
Start: 2021-12-16 | End: 2022-01-14

## 2021-12-16 RX ORDER — LOSARTAN POTASSIUM 100 MG/1
1 TABLET, FILM COATED ORAL
Qty: 90 | Refills: 0
Start: 2021-12-16 | End: 2022-03-15

## 2021-12-16 RX ORDER — ATORVASTATIN CALCIUM 80 MG/1
1 TABLET, FILM COATED ORAL
Qty: 0 | Refills: 0 | DISCHARGE
Start: 2021-12-16

## 2021-12-16 RX ORDER — ASPIRIN/CALCIUM CARB/MAGNESIUM 324 MG
1 TABLET ORAL
Qty: 90 | Refills: 0
Start: 2021-12-16 | End: 2022-03-15

## 2021-12-16 RX ORDER — ATORVASTATIN CALCIUM 80 MG/1
1 TABLET, FILM COATED ORAL
Qty: 90 | Refills: 0
Start: 2021-12-16 | End: 2022-03-15

## 2021-12-16 RX ORDER — ATORVASTATIN CALCIUM 80 MG/1
1 TABLET, FILM COATED ORAL
Qty: 30 | Refills: 0
Start: 2021-12-16 | End: 2022-01-14

## 2021-12-16 RX ORDER — ASPIRIN/CALCIUM CARB/MAGNESIUM 324 MG
1 TABLET ORAL
Qty: 0 | Refills: 0 | DISCHARGE
Start: 2021-12-16

## 2021-12-16 RX ORDER — ATORVASTATIN CALCIUM 80 MG/1
40 TABLET, FILM COATED ORAL AT BEDTIME
Refills: 0 | Status: DISCONTINUED | OUTPATIENT
Start: 2021-12-16 | End: 2021-12-16

## 2021-12-16 RX ORDER — LOSARTAN POTASSIUM 100 MG/1
0 TABLET, FILM COATED ORAL
Qty: 0 | Refills: 0 | DISCHARGE

## 2021-12-16 RX ORDER — AMPICILLIN TRIHYDRATE 250 MG
0 CAPSULE ORAL
Qty: 0 | Refills: 0 | DISCHARGE

## 2021-12-16 RX ADMIN — Medication 12.5 MILLIGRAM(S): at 05:09

## 2021-12-16 RX ADMIN — Medication 650 MILLIGRAM(S): at 08:18

## 2021-12-16 RX ADMIN — Medication 81 MILLIGRAM(S): at 11:52

## 2021-12-16 RX ADMIN — Medication 650 MILLIGRAM(S): at 09:00

## 2021-12-16 NOTE — DISCHARGE NOTE NURSING/CASE MANAGEMENT/SOCIAL WORK - PATIENT PORTAL LINK FT
You can access the FollowMyHealth Patient Portal offered by [facility] by registering at the following website: [email or web address]. By joining [facility]’s FollowMyHealth portal, you will also be able to view your health information using other applications (apps) compatible with our system.

## 2021-12-16 NOTE — DISCHARGE NOTE PROVIDER - NSDCMRMEDTOKEN_GEN_ALL_CORE_FT
losartan: orally once a day   aspirin 81 mg oral tablet, chewable: 1 tab(s) orally once a day  Lipitor 40 mg oral tablet: 1 tab(s) orally once a day (at bedtime)  losartan: orally once a day

## 2021-12-16 NOTE — DISCHARGE NOTE PROVIDER - HOSPITAL COURSE
Ms. Horner is a 55yo Nepali-speaking F who resides in [place] and has a history of HTN and HLD who presented to the ED with chest pain.     Per the patient, ~1.5 years ago, she had an episode of feeling unwell and fatigued with associated chest pain, heart racing, and dyspnea. She went to a local clinic in [place], where her ECG and lipid panel were checked. She was told she had a "srini-infarct" and was given medications to reduce her cholesterol. Since then, patient has had stable dyspnea on exertion, only able to walk 15 minutes before having to catch her breath. She also has recurrent episodes of chest pain, up to once per week, that occur with rest or with exertion.     Patient presented to the ED because she had one of her chest pain episodes, but it was worse than normal. Troponin negative x 3. CCTA with moderate-severe narrowing of the p-mLAD. Plan for LHC.     Of note, patient's recurrent chest pain episodes may also be related to abnormal heart rhythms as patient also describes her heart racing and beating strongly. Patient and family is aware that we will first investigate CAD and if she has ongoing symptoms, we will need to rule out arrhythmias.   A1c 5.7,   < from: TTE Echo Complete w/o Contrast w/ Doppler (12.15.21 @ 11:04) >    Summary:   1. Normal global left ventricular systolic function.   2. LV Ejection Fraction by Barlow's Method with a biplane EF of 55 %.   3. Normal left atrial size.   4. Normal right atrial size.   5. Trace mitral valve regurgitation.    < end of copied text >  Cath showed nonobstructive CAD. Pt is medically stable to be discharged      Ms. Horner is a 57 yo Frisian-speaking F who resides in [place] and has a history of HTN and HLD who presented to the ED with chest pain.     The patient describes the chest pain as sharp, superficial, with no alleviating or relieving factors, no associated shortness of breath, palpitations, cough. ROS is negative.    In the ED, patient was hemodynamically stable, EKG did not show any acute ischemic changes, Troponin negative x 3.   However, a CCTA shows moderate-severe narrowing of the proximal to medial LAD.     She was admitted for further investigations and management.    A TTE did not show any abnormalities, with an EF= 55%.  A coronary angiogram was normal.  No events on telemetry that can correlate with the symptoms that she has been experiencing.    Patient instructed that her symptoms might be due to arrhythmias that went undetected while being hospitalized, and she needs to follow-up with cardiologist and PCP as outpatient.       Ms. Horner is a 55 yo Setswana-speaking F who resides in [place] and has a history of HTN and HLD who presented to the ED with chest pain.     Per the patient, ~1.5 years ago, she had an episode of feeling unwell and fatigued with associated chest pain, heart racing, and dyspnea. She went to a local clinic in [place], where her ECG and lipid panel were checked. She was told she had a "srini-infarct" and was given medications to reduce her cholesterol. Since then, patient has had stable dyspnea on exertion, only able to walk 15 minutes before having to catch her breath. She also has recurrent episodes of chest pain, up to once per week, that occur with rest or with exertion. She presented because her chest pain was worse than her other episodes. Troponin negative x 3. CCTA with moderate-severe narrowing of the p-mLAD. TTE did not show any abnormalities, with an EF= 55%. Coronary angiogram revealed mild non-obstructive CAD.     Patient states she had her typical chest pain syndrome while hospitalized. No events on telemetry.     Given the patient was in sinus rhythm throughout her hospitalization, her TTE was normal, and her LHC showed mild CAD, her chest pain syndrome is non-cardiac in nature. Patient will be continued on her home losartan and also discharged with aspirin 81 mg daily and atorvastatin 40 mg nightly.

## 2021-12-16 NOTE — DISCHARGE NOTE PROVIDER - ATTENDING DISCHARGE PHYSICAL EXAMINATION:
Cardiac and lung exam are unremarkable. Patient states she had her typical chest pain symptoms yesterday, no events on telemetry. All information was relayed to her son Seamus Lopez, per patient request.

## 2021-12-16 NOTE — DISCHARGE NOTE PROVIDER - NSDCCPCAREPLAN_GEN_ALL_CORE_FT
PRINCIPAL DISCHARGE DIAGNOSIS  Diagnosis: Chest pain, rule out acute myocardial infarction  Assessment and Plan of Treatment: You had some pain in your chest. We were concerned that it may be a block in the vessels of the heart, you underwent cardiac catheterization which showed no block. Your heart function is normal on echocardiogram. Please follow up with your cardiologist and primary care physician.      SECONDARY DISCHARGE DIAGNOSES  Diagnosis: HLD (hyperlipidemia)  Assessment and Plan of Treatment: You have high cholesterol, please take your medications as prescribed.

## 2021-12-16 NOTE — DISCHARGE NOTE PROVIDER - CARE PROVIDER_API CALL
Daniel Schultz)  Internal Medicine  [address] [place] [address]  Phone: [phone]  Fax: [phone]  Follow Up Time: 2 weeks

## 2021-12-16 NOTE — DISCHARGE NOTE NURSING/CASE MANAGEMENT/SOCIAL WORK - NSDCPEFALRISK_GEN_ALL_CORE
For information on Fall & Injury Prevention, visit: [email or web address] OR  [email or web address] OR  https://www.cdc.gov/steadi/patient.html

## 2021-12-21 DIAGNOSIS — E78.5 HYPERLIPIDEMIA, UNSPECIFIED: ICD-10-CM

## 2021-12-21 DIAGNOSIS — R07.9 CHEST PAIN, UNSPECIFIED: ICD-10-CM

## 2021-12-21 DIAGNOSIS — I10 ESSENTIAL (PRIMARY) HYPERTENSION: ICD-10-CM

## 2021-12-21 DIAGNOSIS — Z82.49 FAMILY HISTORY OF ISCHEMIC HEART DISEASE AND OTHER DISEASES OF THE CIRCULATORY SYSTEM: ICD-10-CM

## 2021-12-21 DIAGNOSIS — I25.10 ATHEROSCLEROTIC HEART DISEASE OF NATIVE CORONARY ARTERY WITHOUT ANGINA PECTORIS: ICD-10-CM

## 2021-12-21 DIAGNOSIS — G43.909 MIGRAINE, UNSPECIFIED, NOT INTRACTABLE, WITHOUT STATUS MIGRAINOSUS: ICD-10-CM

## 2021-12-29 ENCOUNTER — OUTPATIENT (OUTPATIENT)
Dept: OUTPATIENT SERVICES | Facility: HOSPITAL | Age: 56
LOS: 1 days | Discharge: HOME | End: 2021-12-29

## 2021-12-29 ENCOUNTER — APPOINTMENT (OUTPATIENT)
Dept: INTERNAL MEDICINE | Facility: CLINIC | Age: 56
End: 2021-12-29
Payer: MEDICAID

## 2021-12-29 VITALS
OXYGEN SATURATION: 97 % | BODY MASS INDEX: 22.97 KG/M2 | HEART RATE: 80 BPM | DIASTOLIC BLOOD PRESSURE: 71 MMHG | WEIGHT: 117 LBS | HEIGHT: 60 IN | TEMPERATURE: 96.9 F | SYSTOLIC BLOOD PRESSURE: 117 MMHG

## 2021-12-29 DIAGNOSIS — E78.5 HYPERLIPIDEMIA, UNSPECIFIED: ICD-10-CM

## 2021-12-29 DIAGNOSIS — K21.9 GASTRO-ESOPHAGEAL REFLUX DISEASE WITHOUT ESOPHAGITIS: ICD-10-CM

## 2021-12-29 DIAGNOSIS — Z00.00 ENCOUNTER FOR GENERAL ADULT MEDICAL EXAMINATION WITHOUT ABNORMAL FINDINGS: ICD-10-CM

## 2021-12-29 DIAGNOSIS — I25.10 ATHEROSCLEROTIC HEART DISEASE OF NATIVE CORONARY ARTERY WITHOUT ANGINA PECTORIS: ICD-10-CM

## 2021-12-29 DIAGNOSIS — I10 ESSENTIAL (PRIMARY) HYPERTENSION: ICD-10-CM

## 2021-12-29 PROCEDURE — 99386 PREV VISIT NEW AGE 40-64: CPT | Mod: GC

## 2021-12-29 NOTE — HISTORY OF PRESENT ILLNESS
[FreeTextEntry1] : was told to come to PCP after discharge from Hospital [de-identified] : Ms. Horner is a 55 yo Bruneian-speaking F who resides in [place] and has a history of HTN and HLD with recent admission to [facility] (12/14-12/16) for Chest pain. Per the patient, years ago, she had an episode of feeling unwell and fatigued with associated chest pain, heart racing, and dyspnea. She went to a local clinic in [place], where her ECG and lipid panel were checked. She was told she had a "srini-infarct" and was given medications to reduce her cholesterol. Since then, patient has had stable dyspnea on exertion, only able \par to walk 15 minutes before having to catch her breath. She also has recurrent episodes of chest pain, up to once per week, that occur with rest or with exertion. During her hospital course pt had Troponin negative x 3. CCTA with moderate-severe narrowing of the \par p-mLAD. TTE did not show any abnormalities, with an EF= 55%. Coronary angiogram revealed mild non-obstructive CAD.  Patient discharged with aspirin 81 mg daily and atorvastatin 40 mg and her home dose Losartan. Today pt presents to clinic to establish care. \par Today pt complaints of abdominal pain which started a year ago, comes and goes, provoked after taking pills or when she eats. Pt never had EGD or Colonoscopy. \par \par \par

## 2021-12-29 NOTE — ASSESSMENT
[FreeTextEntry1] : Ms. Horner is a 55 yo Barbadian-speaking F who resides in [place] and has a history of HTN and HLD withj recent admission to [facility] (12/14-12/16) for Chest pain.  During her hospital course pt had Troponin negative x 3. CCTA with moderate-severe narrowing of the p-mLAD. TTE did not show any abnormalities, with an EF= 55%. Coronary angiogram revealed mild non-obstructive CAD. Patient discharged with aspirin 81 mg daily and atorvastatin 40 mg and her home dose Losartan. \par Today pt presents to clinic to establish care. Today pt complaints of abdominal pain which started a year ago, comes and goes, provoked after taking pills or when she eats. Pt never had EGD or Colonoscopy. \par \par #Abdominal pain\par - Signs and symptoms correlates with GERD\par - Avoid Spicy food, NSAIDs\par - Start Pantoprazole 40 mg daily\par - if symptoms not improved - may require GI f/u and EGD\par \par #Knee pain b/l\par - likely Ostarthritis\par - Tylenol PRN\par \par #HTN- controlled\par - c/w Losartan 50 mg\par \par #Mild non-obstructive CAD\par #DLD\par - T. Cholest 192, , , HDL 41 Dec/2021\par - c/w ASA and Atorvastatin 40 mg\par - ASCVD 10 year risk 2.8%\par \par \par #HCM\par - COVID-19 vaccine UTD (Pfizer) second dose 07/2021\par - Mammogram and Gyn referral sent\par - Flu Shot deferred at this time\par - RTC 3  months or PRN\par \par

## 2021-12-29 NOTE — PHYSICAL EXAM
[No Acute Distress] : no acute distress [Well Nourished] : well nourished [Well Developed] : well developed [Normal Outer Ear/Nose] : the outer ears and nose were normal in appearance [No Respiratory Distress] : no respiratory distress  [No Accessory Muscle Use] : no accessory muscle use [Clear to Auscultation] : lungs were clear to auscultation bilaterally [Normal Rate] : normal rate  [Regular Rhythm] : with a regular rhythm [Normal S1, S2] : normal S1 and S2 [No Murmur] : no murmur heard [Normal Affect] : the affect was normal [Normal Insight/Judgement] : insight and judgment were intact

## 2021-12-29 NOTE — REVIEW OF SYSTEMS
[Vision Problems] : vision problems [Abdominal Pain] : abdominal pain [Joint Pain] : joint pain [Back Pain] : back pain [Itching] : Itching [Fever] : no fever [Chills] : no chills [Night Sweats] : no night sweats [Discharge] : no discharge [Pain] : no pain [Itching] : no itching [Earache] : no earache [Hearing Loss] : no hearing loss [Sore Throat] : no sore throat [Chest Pain] : no chest pain [Palpitations] : no palpitations [Shortness Of Breath] : no shortness of breath [Wheezing] : no wheezing [Cough] : no cough [Nausea] : no nausea [Constipation] : no constipation [Diarrhea] : diarrhea [Vomiting] : no vomiting [Heartburn] : no heartburn [Dysuria] : no dysuria [Skin Rash] : no skin rash [Headache] : no headache [Dizziness] : no dizziness [Anxiety] : no anxiety [Depression] : no depression [FreeTextEntry9] : b/l knee [de-identified] : lower leg

## 2022-02-23 ENCOUNTER — APPOINTMENT (OUTPATIENT)
Dept: INTERNAL MEDICINE | Facility: CLINIC | Age: 57
End: 2022-02-23
Payer: MEDICAID

## 2022-02-23 ENCOUNTER — NON-APPOINTMENT (OUTPATIENT)
Age: 57
End: 2022-02-23

## 2022-02-23 ENCOUNTER — OUTPATIENT (OUTPATIENT)
Dept: OUTPATIENT SERVICES | Facility: HOSPITAL | Age: 57
LOS: 1 days | Discharge: HOME | End: 2022-02-23

## 2022-02-23 VITALS
HEART RATE: 82 BPM | WEIGHT: 120 LBS | BODY MASS INDEX: 23.56 KG/M2 | HEIGHT: 60 IN | OXYGEN SATURATION: 99 % | DIASTOLIC BLOOD PRESSURE: 72 MMHG | TEMPERATURE: 97.9 F | SYSTOLIC BLOOD PRESSURE: 130 MMHG

## 2022-02-23 DIAGNOSIS — I10 ESSENTIAL (PRIMARY) HYPERTENSION: ICD-10-CM

## 2022-02-23 DIAGNOSIS — Z00.00 ENCOUNTER FOR GENERAL ADULT MEDICAL EXAMINATION W/OUT ABNORMAL FINDINGS: ICD-10-CM

## 2022-02-23 PROCEDURE — 99213 OFFICE O/P EST LOW 20 MIN: CPT | Mod: GC

## 2022-02-26 LAB
ALBUMIN SERPL ELPH-MCNC: 4.4 G/DL
ALP BLD-CCNC: 188 U/L
ALT SERPL-CCNC: 35 U/L
ANION GAP SERPL CALC-SCNC: 14 MMOL/L
AST SERPL-CCNC: 24 U/L
BASOPHILS # BLD AUTO: 0.03 K/UL
BASOPHILS NFR BLD AUTO: 0.5 %
BILIRUB SERPL-MCNC: 0.4 MG/DL
BUN SERPL-MCNC: 17 MG/DL
CALCIUM SERPL-MCNC: 9.6 MG/DL
CHLORIDE SERPL-SCNC: 103 MMOL/L
CHOLEST SERPL-MCNC: 283 MG/DL
CO2 SERPL-SCNC: 23 MMOL/L
CREAT SERPL-MCNC: 0.6 MG/DL
EOSINOPHIL # BLD AUTO: 0.17 K/UL
EOSINOPHIL NFR BLD AUTO: 2.6 %
ESTIMATED AVERAGE GLUCOSE: 108 MG/DL
GLUCOSE SERPL-MCNC: 97 MG/DL
HBA1C MFR BLD HPLC: 5.4 %
HCT VFR BLD CALC: 39.9 %
HDLC SERPL-MCNC: 40 MG/DL
HGB BLD-MCNC: 13.1 G/DL
IMM GRANULOCYTES NFR BLD AUTO: 0.2 %
LDLC SERPL CALC-MCNC: 180 MG/DL
LYMPHOCYTES # BLD AUTO: 2.95 K/UL
LYMPHOCYTES NFR BLD AUTO: 45 %
MAN DIFF?: NORMAL
MCHC RBC-ENTMCNC: 29.6 PG
MCHC RBC-ENTMCNC: 32.8 G/DL
MCV RBC AUTO: 90.3 FL
MONOCYTES # BLD AUTO: 0.61 K/UL
MONOCYTES NFR BLD AUTO: 9.3 %
NEUTROPHILS # BLD AUTO: 2.78 K/UL
NEUTROPHILS NFR BLD AUTO: 42.4 %
NONHDLC SERPL-MCNC: 243 MG/DL
PLATELET # BLD AUTO: 339 K/UL
POTASSIUM SERPL-SCNC: 4.2 MMOL/L
PROT SERPL-MCNC: 7.2 G/DL
RBC # BLD: 4.42 M/UL
RBC # FLD: 12 %
SODIUM SERPL-SCNC: 140 MMOL/L
T4 FREE SERPL-MCNC: 1.3 NG/DL
TRIGL SERPL-MCNC: 390 MG/DL
TSH SERPL-ACNC: 1.66 UIU/ML
WBC # FLD AUTO: 6.55 K/UL

## 2022-02-28 PROBLEM — I10 HTN (HYPERTENSION): Status: ACTIVE | Noted: 2021-12-29

## 2022-02-28 NOTE — PHYSICAL EXAM
[Normal Sclera/Conjunctiva] : normal sclera/conjunctiva [No Edema] : there was no peripheral edema [Normal Gait] : normal gait [Normal] : affect was normal and insight and judgment were intact [de-identified] : right elbow point tenderness superior to olecranon

## 2022-02-28 NOTE — HISTORY OF PRESENT ILLNESS
[FreeTextEntry1] : follow-up visit. [de-identified] : 55 y/o British speaking female from [place] with PMHx of HTN, HLD, and nonobstructive CAD on ASA, presenting to the clinic for follow-up. Patient reports right elbow pain that started suddenly 8 days ago and has been constant since. She states that it is worse at night and also worse with elbow flexion. She cannot recall any trauma to the area and has not tried taking anything for the pain. \par Patient also continues to complain of heartburn that is worse after eating food and swallowing pills. Denies odynophagia and dysphagia. \par

## 2022-02-28 NOTE — ASSESSMENT
[FreeTextEntry1] : 57 y/o Kazakh speaking female with PMHx of HTN, HLD, and nonobstructive CAD on ASA, presenting to the clinic for follow-up. \par \par # Heartburn\par - Continue Pantoprazole 40 mg daily\par - if symptoms not improved - may require GI f/u and EGD\par \par # HTN \par - Continue Losartan 50 mg\par  \par # Mild non-obstructive CAD\par # DLD\par - T. Cholest 192, , , HDL 41 Dec/2021\par - Continue ASA and Atorvastatin 40 mg\par - ASCVD 10 year risk 2.8%\par \par # HCM\par - COVID-19 vaccine UTD (Pfizer) second dose 07/2021\par - Mammogram and Gyn referral sent\par - Flu shot deferred at last visit \par - RTC 3 months or PRN\par \par

## 2022-02-28 NOTE — REVIEW OF SYSTEMS
[Heartburn] : heartburn [Joint Pain] : joint pain [Back Pain] : back pain [Negative] : Constitutional [Chest Pain] : no chest pain [Palpitations] : no palpitations [Abdominal Pain] : no abdominal pain [Nausea] : no nausea [Constipation] : no constipation [Diarrhea] : diarrhea [Vomiting] : no vomiting [Dysuria] : no dysuria [Hematuria] : no hematuria [FreeTextEntry9] : right elbow pain

## 2022-03-08 DIAGNOSIS — I10 ESSENTIAL (PRIMARY) HYPERTENSION: ICD-10-CM

## 2022-03-08 DIAGNOSIS — E78.5 HYPERLIPIDEMIA, UNSPECIFIED: ICD-10-CM

## 2022-03-08 DIAGNOSIS — I25.10 ATHEROSCLEROTIC HEART DISEASE OF NATIVE CORONARY ARTERY WITHOUT ANGINA PECTORIS: ICD-10-CM

## 2022-03-08 DIAGNOSIS — Z00.00 ENCOUNTER FOR GENERAL ADULT MEDICAL EXAMINATION WITHOUT ABNORMAL FINDINGS: ICD-10-CM

## 2022-03-15 ENCOUNTER — EMERGENCY (EMERGENCY)
Facility: HOSPITAL | Age: 57
LOS: 0 days | Discharge: HOME | End: 2022-03-16
Attending: EMERGENCY MEDICINE | Admitting: EMERGENCY MEDICINE
Payer: MEDICAID

## 2022-03-15 VITALS
HEART RATE: 88 BPM | SYSTOLIC BLOOD PRESSURE: 130 MMHG | OXYGEN SATURATION: 99 % | TEMPERATURE: 99 F | HEIGHT: 62 IN | RESPIRATION RATE: 18 BRPM | WEIGHT: 121.03 LBS | DIASTOLIC BLOOD PRESSURE: 63 MMHG

## 2022-03-15 DIAGNOSIS — E78.5 HYPERLIPIDEMIA, UNSPECIFIED: ICD-10-CM

## 2022-03-15 DIAGNOSIS — R51.9 HEADACHE, UNSPECIFIED: ICD-10-CM

## 2022-03-15 DIAGNOSIS — I25.10 ATHEROSCLEROTIC HEART DISEASE OF NATIVE CORONARY ARTERY WITHOUT ANGINA PECTORIS: ICD-10-CM

## 2022-03-15 DIAGNOSIS — I10 ESSENTIAL (PRIMARY) HYPERTENSION: ICD-10-CM

## 2022-03-15 DIAGNOSIS — R22.0 LOCALIZED SWELLING, MASS AND LUMP, HEAD: ICD-10-CM

## 2022-03-15 LAB
ALBUMIN SERPL ELPH-MCNC: 4.5 G/DL — SIGNIFICANT CHANGE UP (ref 3.5–5.2)
ALP SERPL-CCNC: 151 U/L — HIGH (ref 30–115)
ALT FLD-CCNC: 27 U/L — SIGNIFICANT CHANGE UP (ref 0–41)
ANION GAP SERPL CALC-SCNC: 12 MMOL/L — SIGNIFICANT CHANGE UP (ref 7–14)
APPEARANCE UR: ABNORMAL
AST SERPL-CCNC: 24 U/L — SIGNIFICANT CHANGE UP (ref 0–41)
BASOPHILS # BLD AUTO: 0.01 K/UL — SIGNIFICANT CHANGE UP (ref 0–0.2)
BASOPHILS NFR BLD AUTO: 0.3 % — SIGNIFICANT CHANGE UP (ref 0–1)
BILIRUB DIRECT SERPL-MCNC: <0.2 MG/DL — SIGNIFICANT CHANGE UP (ref 0–0.3)
BILIRUB INDIRECT FLD-MCNC: >0.1 MG/DL — LOW (ref 0.2–1.2)
BILIRUB SERPL-MCNC: 0.3 MG/DL — SIGNIFICANT CHANGE UP (ref 0.2–1.2)
BILIRUB UR-MCNC: NEGATIVE — SIGNIFICANT CHANGE UP
BUN SERPL-MCNC: 10 MG/DL — SIGNIFICANT CHANGE UP (ref 10–20)
CALCIUM SERPL-MCNC: 9.6 MG/DL — SIGNIFICANT CHANGE UP (ref 8.5–10.1)
CHLORIDE SERPL-SCNC: 104 MMOL/L — SIGNIFICANT CHANGE UP (ref 98–110)
CO2 SERPL-SCNC: 23 MMOL/L — SIGNIFICANT CHANGE UP (ref 17–32)
COLOR SPEC: SIGNIFICANT CHANGE UP
CREAT SERPL-MCNC: 0.5 MG/DL — LOW (ref 0.7–1.5)
DIFF PNL FLD: ABNORMAL
EGFR: 111 ML/MIN/1.73M2 — SIGNIFICANT CHANGE UP
EOSINOPHIL # BLD AUTO: 0.05 K/UL — SIGNIFICANT CHANGE UP (ref 0–0.7)
EOSINOPHIL NFR BLD AUTO: 1.3 % — SIGNIFICANT CHANGE UP (ref 0–8)
GLUCOSE SERPL-MCNC: 96 MG/DL — SIGNIFICANT CHANGE UP (ref 70–99)
GLUCOSE UR QL: NEGATIVE — SIGNIFICANT CHANGE UP
HCT VFR BLD CALC: 40.7 % — SIGNIFICANT CHANGE UP (ref 37–47)
HGB BLD-MCNC: 13.9 G/DL — SIGNIFICANT CHANGE UP (ref 12–16)
IMM GRANULOCYTES NFR BLD AUTO: 0.3 % — SIGNIFICANT CHANGE UP (ref 0.1–0.3)
KETONES UR-MCNC: NEGATIVE — SIGNIFICANT CHANGE UP
LEUKOCYTE ESTERASE UR-ACNC: ABNORMAL
LIDOCAIN IGE QN: 55 U/L — SIGNIFICANT CHANGE UP (ref 7–60)
LYMPHOCYTES # BLD AUTO: 1.75 K/UL — SIGNIFICANT CHANGE UP (ref 1.2–3.4)
LYMPHOCYTES # BLD AUTO: 44.8 % — SIGNIFICANT CHANGE UP (ref 20.5–51.1)
MCHC RBC-ENTMCNC: 30.2 PG — SIGNIFICANT CHANGE UP (ref 27–31)
MCHC RBC-ENTMCNC: 34.2 G/DL — SIGNIFICANT CHANGE UP (ref 32–37)
MCV RBC AUTO: 88.5 FL — SIGNIFICANT CHANGE UP (ref 81–99)
MONOCYTES # BLD AUTO: 0.35 K/UL — SIGNIFICANT CHANGE UP (ref 0.1–0.6)
MONOCYTES NFR BLD AUTO: 9 % — SIGNIFICANT CHANGE UP (ref 1.7–9.3)
NEUTROPHILS # BLD AUTO: 1.74 K/UL — SIGNIFICANT CHANGE UP (ref 1.4–6.5)
NEUTROPHILS NFR BLD AUTO: 44.3 % — SIGNIFICANT CHANGE UP (ref 42.2–75.2)
NITRITE UR-MCNC: NEGATIVE — SIGNIFICANT CHANGE UP
NRBC # BLD: 0 /100 WBCS — SIGNIFICANT CHANGE UP (ref 0–0)
PH UR: 6.5 — SIGNIFICANT CHANGE UP (ref 5–8)
PLATELET # BLD AUTO: 250 K/UL — SIGNIFICANT CHANGE UP (ref 130–400)
POTASSIUM SERPL-MCNC: 4.5 MMOL/L — SIGNIFICANT CHANGE UP (ref 3.5–5)
POTASSIUM SERPL-SCNC: 4.5 MMOL/L — SIGNIFICANT CHANGE UP (ref 3.5–5)
PROT SERPL-MCNC: 7.1 G/DL — SIGNIFICANT CHANGE UP (ref 6–8)
PROT UR-MCNC: NEGATIVE — SIGNIFICANT CHANGE UP
RBC # BLD: 4.6 M/UL — SIGNIFICANT CHANGE UP (ref 4.2–5.4)
RBC # FLD: 11.9 % — SIGNIFICANT CHANGE UP (ref 11.5–14.5)
SODIUM SERPL-SCNC: 139 MMOL/L — SIGNIFICANT CHANGE UP (ref 135–146)
SP GR SPEC: 1.01 — SIGNIFICANT CHANGE UP (ref 1.01–1.03)
UROBILINOGEN FLD QL: SIGNIFICANT CHANGE UP
WBC # BLD: 3.91 K/UL — LOW (ref 4.8–10.8)
WBC # FLD AUTO: 3.91 K/UL — LOW (ref 4.8–10.8)

## 2022-03-15 PROCEDURE — 99284 EMERGENCY DEPT VISIT MOD MDM: CPT

## 2022-03-15 RX ORDER — SODIUM CHLORIDE 9 MG/ML
1000 INJECTION INTRAMUSCULAR; INTRAVENOUS; SUBCUTANEOUS ONCE
Refills: 0 | Status: COMPLETED | OUTPATIENT
Start: 2022-03-15 | End: 2022-03-15

## 2022-03-15 RX ORDER — FAMOTIDINE 10 MG/ML
20 INJECTION INTRAVENOUS ONCE
Refills: 0 | Status: COMPLETED | OUTPATIENT
Start: 2022-03-15 | End: 2022-03-15

## 2022-03-15 RX ORDER — MECLIZINE HCL 12.5 MG
50 TABLET ORAL ONCE
Refills: 0 | Status: COMPLETED | OUTPATIENT
Start: 2022-03-15 | End: 2022-03-15

## 2022-03-15 RX ORDER — ONDANSETRON 8 MG/1
4 TABLET, FILM COATED ORAL ONCE
Refills: 0 | Status: DISCONTINUED | OUTPATIENT
Start: 2022-03-15 | End: 2022-03-16

## 2022-03-15 RX ORDER — ACETAMINOPHEN 500 MG
650 TABLET ORAL ONCE
Refills: 0 | Status: COMPLETED | OUTPATIENT
Start: 2022-03-15 | End: 2022-03-15

## 2022-03-15 RX ADMIN — FAMOTIDINE 20 MILLIGRAM(S): 10 INJECTION INTRAVENOUS at 20:44

## 2022-03-15 RX ADMIN — Medication 30 MILLILITER(S): at 20:43

## 2022-03-15 RX ADMIN — Medication 650 MILLIGRAM(S): at 20:44

## 2022-03-15 RX ADMIN — SODIUM CHLORIDE 1000 MILLILITER(S): 9 INJECTION INTRAMUSCULAR; INTRAVENOUS; SUBCUTANEOUS at 20:43

## 2022-03-15 RX ADMIN — Medication 650 MILLIGRAM(S): at 21:14

## 2022-03-15 NOTE — ED PROVIDER NOTE - ATTENDING CONTRIBUTION TO CARE
55 yo female with PMH of HTN, HLD, CAD s/p CCTA/ACACIA/coronary angiogram in Dec 2021 showing mild CAD, GERD (no EGD/colonoscopy yet) presents to ER for left sided HA x 5 days, associated with dizziness (worse with movement), nausea, and epigastric discomfort (exacerbated by meals). No vomiting/diarrhea/fever/chills/SOB/CP/blood in stool/dysuria/numbness/weakness/visual change/slurred speech/facial droop.     On exam NCAT, SUSANA, EOMI, Lungs ctab, Heart regular s1s2, Abdomen soft nt/nd +BS no mass, Ext no edema or calf tenderness, Neuro no motor or sensory deficit. no temporal tenderness, no slurred speech or facial droop, no ataxia, alert and awake, no nystagmus.     A/P Headache appears to be migraine-like. Labs ordered. GI meds, pain med given, in addition to the meclizine. Pt felt better afterwards and requesting to go. Here with son, who confirms pt better and requesting he take her home. DC with follow up to neurology for HA workup as outpatient    ALL: nkda  Meds Lipitor, ASA, losartan, omeprazole  PMD Shazia Araujo  SH denies smoking or etoh

## 2022-03-15 NOTE — ED PROVIDER NOTE - NSFOLLOWUPINSTRUCTIONS_ED_ALL_ED_FT
MAKE AN APPOINTMENT WITH THE GASTROENTEROLOGIST  FOR THE STOMACH PAIN  MAKE AN APPOINTMENT WITH THE NEUROLOGIST FOR THE HEADACHES    General Headache Without Cause  A headache is pain or discomfort felt around the head or neck area. The specific cause of a headache may not be found. There are many causes and types of headaches. A few common ones are:    Tension headaches.  Migraine headaches.  Cluster headaches.  Chronic daily headaches.    Follow these instructions at home:  Watch your condition for any changes. Take these steps to help with your condition:    Managing pain     Take over-the-counter and prescription medicines only as told by your health care provider.  Lie down in a dark, quiet room when you have a headache.  If directed, apply ice to the head and neck area:    Put ice in a plastic bag.  Place a towel between your skin and the bag.  Leave the ice on for 20 minutes, 2–3 times per day.    Use a heating pad or hot shower to apply heat to the head and neck area as told by your health care provider.  ImageKeep lights dim if bright lights bother you or make your headaches worse.  Eating and drinking     Eat meals on a regular schedule.  Limit alcohol use.  Decrease the amount of caffeine you drink, or stop drinking caffeine.  General instructions     Keep all follow-up visits as told by your health care provider. This is important.  Keep a headache journal to help find out what may trigger your headaches. For example, write down:    What you eat and drink.  How much sleep you get.  Any change to your diet or medicines.    Try massage or other relaxation techniques.  Limit stress.  Sit up straight, and do not tense your muscles.  Do not use tobacco products, including cigarettes, chewing tobacco, or e-cigarettes. If you need help quitting, ask your health care provider.  Exercise regularly as told by your health care provider.  ImageSleep on a regular schedule. Get 7–9 hours of sleep, or the amount recommended by your health care provider.  Contact a health care provider if:  Your symptoms are not helped by medicine.  You have a headache that is different from the usual headache.  You have nausea or you vomit.  You have a fever.  Get help right away if:  Your headache becomes severe.  You have repeated vomiting.  You have a stiff neck.  You have a loss of vision.  You have problems with speech.  You have pain in the eye or ear.  You have muscular weakness or loss of muscle control.  You lose your balance or have trouble walking.  You feel faint or pass out.  You have confusion.  This information is not intended to replace advice given to you by your health care provider. Make sure you discuss any questions you have with your health care provider.

## 2022-03-15 NOTE — ED PROVIDER NOTE - CARE PROVIDER_API CALL
Jai Maldonado)  Gastroenterology; Internal Medicine  [address] [place] [address]  Phone: [phone]  Fax: [phone]  Follow Up Time:

## 2022-03-15 NOTE — ED ADULT TRIAGE NOTE - PAIN RATING/NUMBER SCALE (0-10): REST
"Presents with cough on going for the last few days. \"My chest is raw\". \"I think I have bronchitis\". States she has been isolated since January. \"I only do  from [employer]\".  " 8

## 2022-03-15 NOTE — ED PROVIDER NOTE - OBJECTIVE STATEMENT
56 y.o. F, pmh of CAD s/p angio in Dec 2021, HTN HLD ,GERD presenting for HA x 5 days associated with dizziness, tinnitus, nausea and abdominal pain. Pain localized to epigastrium, exacerbated by meals. Is on pantoprazole x 1 month. Has not had EGD/colonoscopy. Denies vomiting, diarrhea blood in stool. No cp or sob. No LOC, visual changes or paresthesias or difficulty ambulating.

## 2022-03-15 NOTE — ED PROVIDER NOTE - CLINICAL SUMMARY MEDICAL DECISION MAKING FREE TEXT BOX
57 yo female with PMH of HTN, HLD, CAD s/p CCTA/ACACIA/coronary angiogram in Dec 2021 showing mild CAD, GERD (no EGD/colonoscopy yet) presents to ER for left sided HA x 5 days, associated with dizziness (worse with movement), nausea, and epigastric discomfort (exacerbated by meals). No vomiting/diarrhea/fever/chills/SOB/CP/blood in stool/dysuria/numbness/weakness/visual change/slurred speech/facial droop. Labs reviewed. ESR not high enough (after age correction) to be concerned about temporal arteritis. Pt felt better after meds and requested to go home. Recommend pt followup with Neuro as outpatient for a headache workup. Also, since she has never had a endoscopy, recommend GI follow up for her long standing GERD.

## 2022-03-15 NOTE — ED PROVIDER NOTE - NSFOLLOWUPCLINICS_GEN_ALL_ED_FT
[facility]  Neurology  [address] [place] [address], [address]  [place] [address]  Phone: [phone]  Fax:

## 2022-03-15 NOTE — ED PROVIDER NOTE - NS ED ROS FT
Constitutional: (-) fever (-) chills  (-) lightheadedness   Eyes/ENT: (-) blurry vision, (-) epistaxis (-) rhinorrhea (-) nasal congestion (+) sinus pressyre (+) tinnitus   Cardiovascular: (-) chest pain, (-) syncope (-) palpitations  Respiratory: (-) cough, (-) shortness of breath (-) pleurisy   Gastrointestinal: (-) vomiting, (-) diarrhea (-) abdominal pain (+) nausea (-) anorexia  Musculoskeletal: (-) neck pain, (-) back pain, (-) joint pain (-) joint swelling (-) painful ROM  Integumentary: (-) rash, (-) edema (-) lacerations (-) pruritis   Neurological: (+) headache, (-) altered mental status (-) LOC (+) dizziness (-) paresthesias (-) gait abnormalities

## 2022-03-15 NOTE — ED PROVIDER NOTE - PHYSICAL EXAMINATION
Physical Exam    Vital Signs: I have reviewed the initial vital signs.  Constitutional: well-nourished, appears stated age, no acute distress  Eyes: Conjunctiva pink, Sclera clear, PERRLA, EOMI, no ptosis, no entrapment, no racoon eyes  Cardiovascular: S1 and S2, regular rate, regular rhythm, well-perfused extremities, radial pulses equal and 2+, calves nonttp, equal in size  Respiratory: unlabored respiratory effort, speaking in full sentences, handling oral secretions, clear to auscultation bilaterally no wheezing, rales and rhonchi  Gastrointestinal: soft, non-tender abdomen, no pulsatile mass, normal bowl sounds  Musculoskeletal: supple neck, no lower extremity edema painful rom, moving all extremities appropriately, no gross bony deformities or swelling.  Integumentary: warm, dry, no rashes, lacerations,  Neurologic: awake, alert, cranial nerves II-XII grossly intact, extremities’ motor and sensory functions grossly intact, no nystagmus, tremors, fasciculations, facial droop, no ataxia, no dysmetria

## 2022-03-15 NOTE — ED ADULT TRIAGE NOTE - CHIEF COMPLAINT QUOTE
PT c/o of L facial numbness and headache starting about x5 days ago. Pt in triage NIH-0. Pt also with c/o of abd pain with some nausea.

## 2022-03-15 NOTE — ED PROVIDER NOTE - PATIENT PORTAL LINK FT
You can access the FollowMyHealth Patient Portal offered by [facility] by registering at the following website: [email or web address]. By joining [employer]’s FollowMyHealth portal, you will also be able to view your health information using other applications (apps) compatible with our system.

## 2022-03-15 NOTE — ED PROVIDER NOTE - NS ED ATTENDING STATEMENT MOD
This was a shared visit with the JES. I reviewed and verified the documentation and independently performed the documented:

## 2022-03-16 VITALS
SYSTOLIC BLOOD PRESSURE: 128 MMHG | RESPIRATION RATE: 18 BRPM | OXYGEN SATURATION: 99 % | HEART RATE: 80 BPM | DIASTOLIC BLOOD PRESSURE: 80 MMHG | TEMPERATURE: 98 F

## 2022-03-16 LAB
BACTERIA # UR AUTO: NEGATIVE — SIGNIFICANT CHANGE UP
EPI CELLS # UR: 4 /HPF — SIGNIFICANT CHANGE UP (ref 0–5)
ERYTHROCYTE [SEDIMENTATION RATE] IN BLOOD: 40 MM/HR — HIGH (ref 0–20)
HYALINE CASTS # UR AUTO: 1 /LPF — SIGNIFICANT CHANGE UP (ref 0–7)
RBC CASTS # UR COMP ASSIST: 7 /HPF — HIGH (ref 0–4)
WBC UR QL: 15 /HPF — HIGH (ref 0–5)

## 2022-03-19 RX ORDER — CEFPODOXIME PROXETIL 100 MG
1 TABLET ORAL
Qty: 20 | Refills: 0
Start: 2022-03-19 | End: 2022-03-28

## 2022-03-21 ENCOUNTER — RESULT REVIEW (OUTPATIENT)
Age: 57
End: 2022-03-21

## 2022-03-21 ENCOUNTER — OUTPATIENT (OUTPATIENT)
Dept: OUTPATIENT SERVICES | Facility: HOSPITAL | Age: 57
LOS: 1 days | Discharge: HOME | End: 2022-03-21
Payer: MEDICAID

## 2022-03-21 DIAGNOSIS — Z12.31 ENCOUNTER FOR SCREENING MAMMOGRAM FOR MALIGNANT NEOPLASM OF BREAST: ICD-10-CM

## 2022-03-21 PROCEDURE — 77067 SCR MAMMO BI INCL CAD: CPT | Mod: 26

## 2022-03-21 PROCEDURE — 77063 BREAST TOMOSYNTHESIS BI: CPT | Mod: 26

## 2022-03-23 ENCOUNTER — OUTPATIENT (OUTPATIENT)
Dept: OUTPATIENT SERVICES | Facility: HOSPITAL | Age: 57
LOS: 1 days | Discharge: HOME | End: 2022-03-23

## 2022-03-23 ENCOUNTER — APPOINTMENT (OUTPATIENT)
Dept: INTERNAL MEDICINE | Facility: CLINIC | Age: 57
End: 2022-03-23
Payer: MEDICAID

## 2022-03-23 ENCOUNTER — NON-APPOINTMENT (OUTPATIENT)
Age: 57
End: 2022-03-23

## 2022-03-23 VITALS
SYSTOLIC BLOOD PRESSURE: 134 MMHG | HEART RATE: 97 BPM | DIASTOLIC BLOOD PRESSURE: 78 MMHG | OXYGEN SATURATION: 96 % | WEIGHT: 120 LBS | BODY MASS INDEX: 23.56 KG/M2 | HEIGHT: 60 IN | TEMPERATURE: 98.1 F

## 2022-03-23 DIAGNOSIS — R92.8 OTHER ABNORMAL AND INCONCLUSIVE FINDINGS ON DIAGNOSTIC IMAGING OF BREAST: ICD-10-CM

## 2022-03-23 DIAGNOSIS — I25.10 ATHEROSCLEROTIC HEART DISEASE OF NATIVE CORONARY ARTERY W/OUT ANGINA PECTORIS: ICD-10-CM

## 2022-03-23 DIAGNOSIS — E78.5 HYPERLIPIDEMIA, UNSPECIFIED: ICD-10-CM

## 2022-03-23 PROCEDURE — 99214 OFFICE O/P EST MOD 30 MIN: CPT | Mod: GC

## 2022-03-23 RX ORDER — ROSUVASTATIN CALCIUM 20 MG/1
20 TABLET, FILM COATED ORAL DAILY
Qty: 90 | Refills: 3 | Status: ACTIVE | COMMUNITY
Start: 2022-03-23 | End: 1900-01-01

## 2022-03-23 RX ORDER — LOSARTAN POTASSIUM 50 MG/1
50 TABLET, FILM COATED ORAL
Qty: 90 | Refills: 2 | Status: ACTIVE | COMMUNITY
Start: 2022-02-11 | End: 1900-01-01

## 2022-03-23 RX ORDER — PANTOPRAZOLE 40 MG/1
40 TABLET, DELAYED RELEASE ORAL DAILY
Qty: 90 | Refills: 2 | Status: ACTIVE | COMMUNITY
Start: 2021-12-29 | End: 1900-01-01

## 2022-03-23 RX ORDER — ATORVASTATIN CALCIUM 40 MG/1
40 TABLET, FILM COATED ORAL
Qty: 30 | Refills: 2 | Status: COMPLETED | COMMUNITY
Start: 2022-02-11 | End: 2022-03-23

## 2022-03-25 ENCOUNTER — NON-APPOINTMENT (OUTPATIENT)
Age: 57
End: 2022-03-25

## 2022-03-28 LAB
CHOLEST SERPL-MCNC: 202 MG/DL
HDLC SERPL-MCNC: 36 MG/DL
LDLC SERPL CALC-MCNC: 92 MG/DL
NONHDLC SERPL-MCNC: 166 MG/DL
TRIGL SERPL-MCNC: 370 MG/DL

## 2022-03-29 DIAGNOSIS — E78.5 HYPERLIPIDEMIA, UNSPECIFIED: ICD-10-CM

## 2022-03-29 DIAGNOSIS — R92.8 OTHER ABNORMAL AND INCONCLUSIVE FINDINGS ON DIAGNOSTIC IMAGING OF BREAST: ICD-10-CM

## 2022-03-29 DIAGNOSIS — I25.10 ATHEROSCLEROTIC HEART DISEASE OF NATIVE CORONARY ARTERY WITHOUT ANGINA PECTORIS: ICD-10-CM

## 2022-04-01 ENCOUNTER — APPOINTMENT (OUTPATIENT)
Dept: OBGYN | Facility: CLINIC | Age: 57
End: 2022-04-01
Payer: COMMERCIAL

## 2022-04-01 ENCOUNTER — OUTPATIENT (OUTPATIENT)
Dept: OUTPATIENT SERVICES | Facility: HOSPITAL | Age: 57
LOS: 1 days | Discharge: HOME | End: 2022-04-01

## 2022-04-01 VITALS
DIASTOLIC BLOOD PRESSURE: 64 MMHG | WEIGHT: 121 LBS | HEIGHT: 60 IN | BODY MASS INDEX: 23.75 KG/M2 | SYSTOLIC BLOOD PRESSURE: 125 MMHG

## 2022-04-01 DIAGNOSIS — Z78.9 OTHER SPECIFIED HEALTH STATUS: ICD-10-CM

## 2022-04-01 PROCEDURE — 99386 PREV VISIT NEW AGE 40-64: CPT

## 2022-04-01 NOTE — HISTORY OF PRESENT ILLNESS
[FreeTextEntry1] : # [id number]\par \par 55yo P2 with PMH HTN, hyperlipidemia, LMP 2011 presenting for annual exam.  Reports CHELSEY with preserved ovaries in 2011 in [place] for "cysts", likely uterine fibroids.  Reports mild vaginal dryness.  Denies hot flashes or night sweats.  Denies any vaginal bleeding since procedure.  Denies abd pain, vaginal discharge, vaginal bleeding, dysuria, hematuria.  Not currently sexually active, has a tubal ligation for contraception.  Mammogram in Mar 2022 was BIRADs 0, scheduled for repeat testing on 4/6. Desires STD testing today.\par \par Obhx:\par G1 FT CS, preeclampsia\par G2 FT CS, emergent repeat, with tubal ligation\par SAB x2, no D&C\par \par Health maintenance:\par Pap smear - 4 years ago in [place], wnl per patient; denies h/o abnormal pap smears\par Mammogram - Mammogram in Mar 2022 was BIRADs 0, scheduled for repeat testing on 4/6\par Colonoscopy - never\par

## 2022-04-01 NOTE — PLAN
[FreeTextEntry1] : 55yo P2 with PMH HTN, hyperlipidemia, LMP 2011 presenting for annual exam, h/o CHELSEY, currently doing well.\par \par - recommend OTC lubrication for vaginal dryness\par - f/u at scheduled repeat breast imaging on 4/6\par - referral for GI for colonoscopy\par - f/u vaginitis\par - no need for further pap smears as patient had CHELSEY for fibroids (no malignancy) and no longer has a cervix, denies history of previous abnormal pap smears\par - RTC in 1 year for annual or prn

## 2022-04-01 NOTE — PHYSICAL EXAM
[Chaperone Present] : A chaperone was present in the examining room during all aspects of the physical examination [Appropriately responsive] : appropriately responsive [Alert] : alert [No Acute Distress] : no acute distress [No Lymphadenopathy] : no lymphadenopathy [Regular Rate Rhythm] : regular rate rhythm [No Murmurs] : no murmurs [Clear to Auscultation B/L] : clear to auscultation bilaterally [Soft] : soft [Non-tender] : non-tender [Non-distended] : non-distended [No HSM] : No HSM [No Lesions] : no lesions [No Mass] : no mass [Oriented x3] : oriented x3 [Examination Of The Breasts] : a normal appearance [Breast Palpation Diffuse Fibrous Tissue Bilateral] : fibrocystic changes [No Masses] : no breast masses were palpable [Labia Majora] : normal [Labia Minora] : normal [Normal] : normal [Absent] : absent [Uterine Adnexae] : non-palpable

## 2022-04-04 LAB — H PYLORI AG STL QL: NOT DETECTED

## 2022-04-04 NOTE — ASSESSMENT
[FreeTextEntry1] : 57 y/o Maltese speaking female with PMHx of HTN, HLD, and nonobstructive CAD on ASA, presenting to the clinic for follow-up.  Patient is non-compliant with medications due to gastritis. \par \par # Heartburn\par - Continue Pantoprazole 40 mg daily\par - f/u H. pyori stool antigen\par - f/u with GI for EGD. \par \par # HTN \par - Continue Losartan 50 mg\par  \par # Mild non-obstructive CAD\par # HLD\par - Continue ASA \par - d/c atorvastatin 40mg\par - start rosuvastatin 20mg\par - repeat Lipid panel in 3 months\par \par #R. Breast mass on screening mammogram\par - BIRADS = 0\par - f/u R. breast diagnostic mammogram\par - f/u R. breast US\par \par # HCM\par - COVID-19 vaccine UTD (Pfizer) second dose 07/2021\par - RTC 3 months \par - will follow with GI in May for colonoscopy and EGD\par \par

## 2022-04-04 NOTE — CURRENT MEDS
[Side Effects] :  side effects [Takes medication as prescribed] : does not take [FreeTextEntry1] : Patient is not taking medications due to gastritis.

## 2022-04-04 NOTE — HISTORY OF PRESENT ILLNESS
[Family Member] : family member [FreeTextEntry1] : follow-up [de-identified] : 57 y/o Indonesian speaking female from [place] with PMHx of HTN, HLD, and nonobstructive CAD on ASA, presenting to the clinic for follow-up. Patient also continues to complain of heartburn that is worse after eating food and swallowing pills. She states the pain is so bad that she has stopped taking her medications completely. She has follow-up with GI in May 2022. Denies odynophagia and dysphagia. \par

## 2022-04-06 ENCOUNTER — RESULT REVIEW (OUTPATIENT)
Age: 57
End: 2022-04-06

## 2022-04-06 ENCOUNTER — OUTPATIENT (OUTPATIENT)
Dept: OUTPATIENT SERVICES | Facility: HOSPITAL | Age: 57
LOS: 1 days | Discharge: HOME | End: 2022-04-06
Payer: OTHER GOVERNMENT

## 2022-04-06 ENCOUNTER — NON-APPOINTMENT (OUTPATIENT)
Age: 57
End: 2022-04-06

## 2022-04-06 DIAGNOSIS — R92.8 OTHER ABNORMAL AND INCONCLUSIVE FINDINGS ON DIAGNOSTIC IMAGING OF BREAST: ICD-10-CM

## 2022-04-06 PROCEDURE — 76642 ULTRASOUND BREAST LIMITED: CPT | Mod: 26,RT

## 2022-04-06 PROCEDURE — 77065 DX MAMMO INCL CAD UNI: CPT | Mod: 26,RT

## 2022-04-06 PROCEDURE — G0279: CPT | Mod: 26

## 2022-04-11 ENCOUNTER — RESULT REVIEW (OUTPATIENT)
Age: 57
End: 2022-04-11

## 2022-04-11 ENCOUNTER — OUTPATIENT (OUTPATIENT)
Dept: OUTPATIENT SERVICES | Facility: HOSPITAL | Age: 57
LOS: 1 days | Discharge: HOME | End: 2022-04-11
Payer: OTHER GOVERNMENT

## 2022-04-11 DIAGNOSIS — R92.8 OTHER ABNORMAL AND INCONCLUSIVE FINDINGS ON DIAGNOSTIC IMAGING OF BREAST: ICD-10-CM

## 2022-04-11 PROCEDURE — 88305 TISSUE EXAM BY PATHOLOGIST: CPT | Mod: 26

## 2022-04-11 PROCEDURE — 19083 BX BREAST 1ST LESION US IMAG: CPT | Mod: RT

## 2022-04-11 PROCEDURE — 77065 DX MAMMO INCL CAD UNI: CPT | Mod: 26,RT

## 2022-04-11 PROCEDURE — 19084 BX BREAST ADD LESION US IMAG: CPT | Mod: RT

## 2022-04-12 LAB — SURGICAL PATHOLOGY STUDY: SIGNIFICANT CHANGE UP

## 2022-04-15 DIAGNOSIS — N60.41 MAMMARY DUCT ECTASIA OF RIGHT BREAST: ICD-10-CM

## 2022-04-15 DIAGNOSIS — N63.10 UNSPECIFIED LUMP IN THE RIGHT BREAST, UNSPECIFIED QUADRANT: ICD-10-CM

## 2022-04-28 NOTE — ED ADULT TRIAGE NOTE - BEFAST SPEECH PHRASE
[FreeTextEntry1] : Awake and alert, in no acute distress\par Attends to both sides. Conjugate gaze without directional limitation. No dysarthria. Face symmetric\par Moves extremities symmetrically\par No dysmetria with reaching for objects\par mild vocal tremor\par  prior:          no pronator drift but b/l arms don’t supinate fully (she says from shoulder arthritis)\par prior: had tremor- noted bl/ at rest, but more with action. holding cup of pens noise emerged after a moment with outstreched hand, b/l and sounded similar. intention tremor, postural tremor.\par                       no dysmetria\par prior Gait: steady with a narrow base, more tremor on L hand with gait. turn was good. speed decent.\par \par \par \par The patient was alert, well groomed, NAD. She was fluent without paraphasic errors. there was some anomia in conversation with circumlocutions at times. Comprehension was intact. processing was a bit slowed.\par she was active in the diuscssion and give her history. she was dysthymic today, mentioned feedback with Dr. Dobson and being upset about her dx. \par she asked appropr questions during counseling\par \par \par  Yes

## 2022-05-27 ENCOUNTER — APPOINTMENT (OUTPATIENT)
Dept: GASTROENTEROLOGY | Facility: CLINIC | Age: 57
End: 2022-05-27
Payer: MEDICAID

## 2022-05-27 ENCOUNTER — OUTPATIENT (OUTPATIENT)
Dept: OUTPATIENT SERVICES | Facility: HOSPITAL | Age: 57
LOS: 1 days | Discharge: HOME | End: 2022-05-27

## 2022-05-27 VITALS
HEART RATE: 89 BPM | TEMPERATURE: 97 F | DIASTOLIC BLOOD PRESSURE: 70 MMHG | WEIGHT: 122 LBS | BODY MASS INDEX: 23.95 KG/M2 | HEIGHT: 60 IN | SYSTOLIC BLOOD PRESSURE: 115 MMHG | OXYGEN SATURATION: 98 %

## 2022-05-27 DIAGNOSIS — Z12.11 ENCOUNTER FOR SCREENING FOR MALIGNANT NEOPLASM OF COLON: ICD-10-CM

## 2022-05-27 DIAGNOSIS — R74.8 ABNORMAL LEVELS OF OTHER SERUM ENZYMES: ICD-10-CM

## 2022-05-27 DIAGNOSIS — R10.13 EPIGASTRIC PAIN: ICD-10-CM

## 2022-05-27 DIAGNOSIS — K21.9 GASTRO-ESOPHAGEAL REFLUX DISEASE W/OUT ESOPHAGITIS: ICD-10-CM

## 2022-05-27 PROCEDURE — 99204 OFFICE O/P NEW MOD 45 MIN: CPT

## 2022-05-27 RX ORDER — POLYETHYLENE GLYCOL 3350 AND ELECTROLYTES WITH LEMON FLAVOR 236; 22.74; 6.74; 5.86; 2.97 G/4L; G/4L; G/4L; G/4L; G/4L
236 POWDER, FOR SOLUTION ORAL
Qty: 1 | Refills: 0 | Status: ACTIVE | COMMUNITY
Start: 2022-05-27 | End: 1900-01-01

## 2022-05-27 RX ORDER — OMEPRAZOLE 20 MG/1
20 TABLET, DELAYED RELEASE ORAL
Qty: 30 | Refills: 2 | Status: ACTIVE | COMMUNITY
Start: 2022-05-27 | End: 1900-01-01

## 2022-05-27 NOTE — END OF VISIT
[] : Fellow [FreeTextEntry3] : 58 yo F with abnormal LFTs (isolated alk P) chronic GERD and CRC screening. will schedule EGD, colonosocpy and repeat her LFts. If elevated would pursue hepatobiliary work up

## 2022-05-27 NOTE — HISTORY OF PRESENT ILLNESS
[Heartburn] : heartburn worsened [Nausea] : denies nausea [Vomiting] : denies vomiting [Diarrhea] : denies diarrhea [Constipation] : denies constipation [Yellow Skin Or Eyes (Jaundice)] : denies jaundice [Abdominal Pain] : denies abdominal pain [Abdominal Swelling] : denies abdominal swelling [Rectal Pain] : denies rectal pain [de-identified] : 57 year female patient is here for evaluation of GERD and screening colonoscopy. Patient with chronic GERD symptoms with several years, initially responsive to omeprazole, but patient mentioned that since several months she ran out of her medication and complaining of worsening GERD. Patient also with dyspepsia like symptoms

## 2022-05-27 NOTE — REVIEW OF SYSTEMS
[As Noted in HPI] : as noted in HPI [Fever] : no fever [Recent Weight Gain (___ Lbs)] : no recent weight gain [Recent Weight Loss (___ Lbs)] : no recent weight loss [Eye Pain] : no eye pain [Earache] : no earache [Chest Pain] : no chest pain [Palpitations] : no palpitations [Shortness Of Breath] : no shortness of breath [Cough] : no cough [SOB on Exertion] : no shortness of breath during exertion [Confused] : no confusion

## 2022-05-27 NOTE — ASSESSMENT
[FreeTextEntry1] : 57 year female patient with GERD, dyspepsia is here for evaluation and screening colonoscopy. Labs reviewed showing elevated alkaline phosphatase.\par \par #GERD/ dyspepsia:\par Life style modification \par GERD education provided \par omeprazole 20 mg daily \par EGD \par \par #screening colonoscopy\par average risk \par on aspirin\par REC:\par colonoscopy \par \par #isolated alkaline phosphatase elevation:\par will recheck LFTs and GGT\par If elevated will start workup and refer to hepatology

## 2022-05-27 NOTE — PHYSICAL EXAM
[General Appearance - Alert] : alert [General Appearance - In No Acute Distress] : in no acute distress [General Appearance - Well Nourished] : well nourished [General Appearance - Well-Appearing] : healthy appearing [Sclera] : the sclera and conjunctiva were normal [Outer Ear] : the ears and nose were normal in appearance [Neck Appearance] : the appearance of the neck was normal [Exaggerated Use Of Accessory Muscles For Inspiration] : no accessory muscle use [Auscultation Breath Sounds / Voice Sounds] : lungs were clear to auscultation bilaterally [Heart Sounds] : normal S1 and S2 [Bowel Sounds] : normal bowel sounds [Abdomen Soft] : soft [Abdomen Tenderness] : non-tender [] : no hepato-splenomegaly [No CVA Tenderness] : no ~M costovertebral angle tenderness [Oriented To Time, Place, And Person] : oriented to person, place, and time

## 2022-05-31 DIAGNOSIS — R10.13 EPIGASTRIC PAIN: ICD-10-CM

## 2022-05-31 DIAGNOSIS — R74.8 ABNORMAL LEVELS OF OTHER SERUM ENZYMES: ICD-10-CM

## 2022-05-31 DIAGNOSIS — Z12.11 ENCOUNTER FOR SCREENING FOR MALIGNANT NEOPLASM OF COLON: ICD-10-CM

## 2022-05-31 DIAGNOSIS — K21.9 GASTRO-ESOPHAGEAL REFLUX DISEASE WITHOUT ESOPHAGITIS: ICD-10-CM

## 2022-08-09 ENCOUNTER — APPOINTMENT (OUTPATIENT)
Dept: NEUROLOGY | Facility: CLINIC | Age: 57
End: 2022-08-09
Payer: MEDICAID

## 2022-08-09 ENCOUNTER — NON-APPOINTMENT (OUTPATIENT)
Age: 57
End: 2022-08-09

## 2022-08-09 ENCOUNTER — OUTPATIENT (OUTPATIENT)
Dept: OUTPATIENT SERVICES | Facility: HOSPITAL | Age: 57
LOS: 1 days | Discharge: HOME | End: 2022-08-09

## 2022-08-09 VITALS
OXYGEN SATURATION: 100 % | BODY MASS INDEX: 23.36 KG/M2 | DIASTOLIC BLOOD PRESSURE: 66 MMHG | HEIGHT: 60 IN | HEART RATE: 74 BPM | WEIGHT: 119 LBS | SYSTOLIC BLOOD PRESSURE: 125 MMHG | TEMPERATURE: 96.2 F

## 2022-08-09 DIAGNOSIS — G43.909 MIGRAINE, UNSPECIFIED, NOT INTRACTABLE, W/OUT STATUS MIGRAINOSUS: ICD-10-CM

## 2022-08-09 DIAGNOSIS — F03.90 UNSPECIFIED DEMENTIA W/OUT BEHAVIORAL DISTURBANCE: ICD-10-CM

## 2022-08-09 DIAGNOSIS — R41.3 OTHER AMNESIA: ICD-10-CM

## 2022-08-09 DIAGNOSIS — M54.2 CERVICALGIA: ICD-10-CM

## 2022-08-09 PROCEDURE — 99204 OFFICE O/P NEW MOD 45 MIN: CPT

## 2022-08-09 NOTE — REASON FOR VISIT
[Initial Evaluation] : an initial evaluation [Family Member] : family member [Other: _____] : [unfilled] [Source: ______] : History obtained from [unfilled]

## 2022-08-10 NOTE — ASSESSMENT
[FreeTextEntry1] : Ms. Chester Frazier is a  58yo  F w/ PMHx of HTN, HLD, and nonobstructive CAD on ASA referred to Neurology clinic by dr Damon for progressive memory loss x 2 years. pt also c/o progressive migraine and UE tingling/numbness and weakness.\par \par \par ##Dementia\par - Low MOCA score\par - B12, TSH\par - MRI brain\par - Referral for NeuroPsychology\par \par #Depression\par - referral to psychiatry\par \par #cervical Radiculopathy \par - PT referral\par \par #Migraine\par - cont current medication prescribe ion [place]\par - Paracetamol, Omega 3\par \par RTC in 3 months\par \par \par \par

## 2022-08-10 NOTE — HISTORY OF PRESENT ILLNESS
[FreeTextEntry1] : Ms. Chester Frazier is a  56yo  F w/ PMHx of HTN, HLD, and nonobstructive CAD on ASA referred to Neurology clinic by dr Damon for progressive memory loss x 2 years.\par \par  pt stated that sometimes she forget where she is however, not requiring someone to get her. She forgets that she is cooking. She expressed good sleep hygiene, independent w/ ADl/IDL. She currently resides w/ family. She a retired RN. She denies smoking, drinking. She reported family h/o dementia in grandparent. \par \par She expressed symptoms of mild depression and low energy, however, denies anhedonia. \par \par \par Pt also c/o left sided headache that started 18 days ago associated w/ blurry vision and numbness and tingling in her hands. She has a h/o migraine however, never associated progressive blurry vision, numbness and tingling. \par \par \par

## 2022-08-10 NOTE — PHYSICAL EXAM
----- Message from Michelle Ling sent at 9/30/2019 10:54 AM CDT -----  Contact: [facility]  Type:  Pharmacy Calling to Clarify an RX    Name of Caller:  Ester  Pharmacy Name:  [facility]  Best Call Back Number:  Ester at   Additional Information:  Calling in regards to the Doctor's Controlled Substance License. She wants to speak with the Nurse     [General Appearance - Alert] : alert [General Appearance - Well Nourished] : well nourished [General Appearance - Well Developed] : well developed [Oriented To Time, Place, And Person] : oriented to person, place, and time [Over the Past 2 Weeks, Have You Felt Down, Depressed, or Hopeless?] : 1.) Over the past 2 weeks, have you felt down, depressed, or hopeless? Yes [Person] : oriented to person [Place] : oriented to place [Time] : oriented to time [Span Intact] : the attention span was normal [Visual Intact] : visual attention was ~T not ~L decreased [___ / 5] : Visuospatial / Executive: [unfilled] / 5 [0 / 0] : Memory: 0 / 0 [___ / 3] : Attention (Serial 7 subtraction): [unfilled] / 3 [___ / 1] : Fluency: [unfilled] / 1 [___ / 2] : Abstraction: [unfilled] / 2 [___ / 5] : Delayed Recall: [unfilled] / 5 [___ / 6] : Orientation: [unfilled] / 6 [Motor Handedness Right-Handed] : the patient is right hand dominant [Motor Strength Upper Extremities Bilaterally] : there was weakness in both upper extremities [Sensation Tactile Decrease] : light touch was intact [Sensation Pain / Temperature Decrease] : pain and temperature was intact [Abnormal Walk] : normal gait [Balance] : balance was intact [1+] : Brachioradialis left 1+ [2+] : Ankle jerk left 2+ [Sclera] : the sclera and conjunctiva were normal [PERRL With Normal Accommodation] : pupils were equal in size, round, reactive to light, with normal accommodation [Outer Ear] : the ears and nose were normal in appearance [Hearing Threshold Finger Rub Not Hoke] : hearing was normal [Both Tympanic Membranes Were Examined] : both tympanic membranes were normal [Neck Appearance] : the appearance of the neck was normal [Neck Cervical Mass (___cm)] : no neck mass was observed [Jugular Venous Distention Increased] : there was no jugular-venous distention [] : no respiratory distress [Heart Rate And Rhythm] : heart rate was normal and rhythm regular [Murmurs] : no murmurs [Over the Past 2 Weeks, Have You Felt Little Interest or Pleasure Doing Things?] : 2.) Over the past 2 weeks, have you felt little interest or pleasure doing things? No [Short Term Intact] : short term memory impaired [Registration Intact] : recent registration memory impaired [Concentration Intact] : a decrease in concentrating ability was observed [Motor Strength Lower Extremities Bilaterally] : strength was normal in both lower extremities [Tremor] : no tremor present [MocaTotal] : 13

## 2022-08-10 NOTE — REVIEW OF SYSTEMS
[Confused or Disoriented] : confusion [Memory Lapses or Loss] : memory loss [Arm Weakness] : arm weakness [Hand Weakness] :  hand weakness [Numbness] : numbness [Tingling] : tingling [Migraine Headache] : migraine headaches [Negative] : Heme/Lymph [Facial Weakness] : no facial weakness [Leg Weakness] : no leg weakness [Poor Coordination] : good coordination [Difficulty Writing] : no difficulty writing [Difficulties in Speech] : no speech difficulties [Abnormal Sensation] : no abnormal sensation [Hypersensitivity] : no hypersensitivity [Seizures] : no convulsions [Dizziness] : no dizziness [Fainting] : no fainting [Lightheadedness] : no lightheadedness [Vertigo] : no vertigo [Cluster Headache] : no cluster headache [Tension Headache] : no tension-type headache [Difficulty Walking] : no difficulty walking [Inability to Walk] : able to walk [Ataxia] : no ataxia [Frequent Falls] : not falling [Limping] : not limping [Sleep Disturbances] : no sleep disturbances

## 2022-08-23 ENCOUNTER — RESULT REVIEW (OUTPATIENT)
Age: 57
End: 2022-08-23

## 2022-08-23 ENCOUNTER — OUTPATIENT (OUTPATIENT)
Dept: OUTPATIENT SERVICES | Facility: HOSPITAL | Age: 57
LOS: 1 days | Discharge: HOME | End: 2022-08-23

## 2022-08-23 DIAGNOSIS — F03.90 UNSPECIFIED DEMENTIA WITHOUT BEHAVIORAL DISTURBANCE: ICD-10-CM

## 2022-08-23 PROCEDURE — 70551 MRI BRAIN STEM W/O DYE: CPT | Mod: 26

## 2022-09-28 ENCOUNTER — OUTPATIENT (OUTPATIENT)
Dept: OUTPATIENT SERVICES | Facility: HOSPITAL | Age: 57
LOS: 1 days | Discharge: HOME | End: 2022-09-28

## 2022-09-30 DIAGNOSIS — M54.12 RADICULOPATHY, CERVICAL REGION: ICD-10-CM

## 2022-10-06 ENCOUNTER — EMERGENCY (EMERGENCY)
Facility: HOSPITAL | Age: 57
LOS: 0 days | Discharge: HOME | End: 2022-10-06
Attending: EMERGENCY MEDICINE | Admitting: EMERGENCY MEDICINE

## 2022-10-06 VITALS
RESPIRATION RATE: 19 BRPM | OXYGEN SATURATION: 99 % | DIASTOLIC BLOOD PRESSURE: 77 MMHG | TEMPERATURE: 98 F | HEART RATE: 85 BPM | SYSTOLIC BLOOD PRESSURE: 160 MMHG | HEIGHT: 62 IN | WEIGHT: 134.92 LBS

## 2022-10-06 DIAGNOSIS — I10 ESSENTIAL (PRIMARY) HYPERTENSION: ICD-10-CM

## 2022-10-06 DIAGNOSIS — Z79.82 LONG TERM (CURRENT) USE OF ASPIRIN: ICD-10-CM

## 2022-10-06 DIAGNOSIS — H57.89 OTHER SPECIFIED DISORDERS OF EYE AND ADNEXA: ICD-10-CM

## 2022-10-06 DIAGNOSIS — E78.5 HYPERLIPIDEMIA, UNSPECIFIED: ICD-10-CM

## 2022-10-06 DIAGNOSIS — H10.13 ACUTE ATOPIC CONJUNCTIVITIS, BILATERAL: ICD-10-CM

## 2022-10-06 DIAGNOSIS — G43.909 MIGRAINE, UNSPECIFIED, NOT INTRACTABLE, WITHOUT STATUS MIGRAINOSUS: ICD-10-CM

## 2022-10-06 PROCEDURE — 99283 EMERGENCY DEPT VISIT LOW MDM: CPT

## 2022-10-06 RX ORDER — OLOPATADINE HYDROCHLORIDE 1 MG/ML
1 SOLUTION/ DROPS OPHTHALMIC
Qty: 5 | Refills: 0
Start: 2022-10-06 | End: 2022-10-12

## 2022-10-06 RX ORDER — CETIRIZINE HYDROCHLORIDE 10 MG/1
1 TABLET ORAL
Qty: 7 | Refills: 0
Start: 2022-10-06 | End: 2022-10-12

## 2022-10-06 NOTE — ED PROVIDER NOTE - ATTENDING APP SHARED VISIT CONTRIBUTION OF CARE
58 yo f with no pmh, presents with b/l eye itching, redness.  pt says has been going on for 2 weeks.  taking benadryl intermittently with some improvement as recommended by pmd.  denies fever or chills.  no vision changes.  no uri, cough, cp or sob.  exam: nad, ncat, perrl, eomi, mmm, b/l periorbital mild erythema, and swelling, +conjunctival injection b/l imp: pt with with b/l eye itching and redness, appears allergic conjunctivitis, will tx symptomatically, f/u with allergy outpt

## 2022-10-06 NOTE — ED PROVIDER NOTE - CLINICAL SUMMARY MEDICAL DECISION MAKING FREE TEXT BOX
pt with with b/l eye itching and redness, appears allergic conjunctivitis, will tx symptomatically, f/u with allergy outpt

## 2022-10-06 NOTE — ED PROVIDER NOTE - NSFOLLOWUPINSTRUCTIONS_ED_ALL_ED_FT
TAKE CETIRIZINE AND BENADRYL AS NEEDED FOR ALLERGIC SYMPTOMS.  DO NOT SCRATCH YOUR EYES.  APPLY PATADAY DROPS TO YOUR EYES AS NEEDED FOR ITCHING.  YOUR PRESCRIPTION WAS SENT TO YOUR PHARMACY [facility].    Allergic Reaction    An allergic reaction is an abnormal reaction to a substance (allergen) by the body's defense system. Common allergens include medicines, food, insect bites or stings, and blood products. The body releases certain proteins into the blood that can cause a variety of symptoms such as an itchy rash, wheezing, swelling of the face/lips/tongue/throat, abdominal pain, nausea or vomiting. An allergic reaction is usually treated with medication. If your health care provider prescribed you an epinephrine injection device, make sure to keep it with you at all times.    SEEK IMMEDIATE MEDICAL CARE IF YOU HAVE ANY OF THE FOLLOWING SYMPTOMS: allergic reaction severe enough that required you to use epinephrine, tightness in your chest, swelling around your lips/tongue/throat, abdominal pain, vomiting or diarrhea, or lightheadedness/dizziness. These symptoms may represent a serious problem that is an emergency. Do not wait to see if the symptoms will go away. Use your auto-injector pen or anaphylaxis kit as you have been instructed. Call 911 and do not drive yourself to the hospital.

## 2022-10-06 NOTE — ED PROVIDER NOTE - OBJECTIVE STATEMENT
57-year-old female no reported past medical history presents for bilateral eye redness and itching x2 weeks.  Patient reports no known allergies but has had increased itching and redness of both eyes.  Denies visual changes, fever, chills, cough, congestion, rhinorrhea, chest pain, shortness of breath.  Patient saw her PMD 1 week ago and was started on Benadryl which she has been taking without relief of symptoms.  No nausea or vomiting.  No other complaints.     used for HPI. ID# [id number]

## 2022-10-06 NOTE — ED PROVIDER NOTE - NS ED ATTENDING STATEMENT MOD
This was a shared visit with the JES. I reviewed and verified the documentation and independently performed the documented: Attending with

## 2022-10-06 NOTE — ED PROVIDER NOTE - PATIENT PORTAL LINK FT
You can access the FollowMyHealth Patient Portal offered by [facility] by registering at the following website: [email or web address]. By joining [employer]’s FollowMyHealth portal, you will also be able to view your health information using other applications (apps) compatible with our system.

## 2022-10-06 NOTE — ED PROVIDER NOTE - PHYSICAL EXAMINATION
VITAL SIGNS: I have reviewed nursing notes and confirm.  CONSTITUTIONAL: well-appearing female in chair, non-toxic, NAD  SKIN: Warm dry, normal skin turgor  HEAD: NCAT  EYES: EOMI, PERRL, no scleral icterus. +conjunctival injection bl. normal visual acuity bl. no ttp, no proptosis.  ENT: Moist mucous membranes, normal pharynx with no erythema or exudates  NECK: Supple; non tender. Full ROM. No cervical LAD  CARD: RRR, no murmurs, rubs or gallops  RESP: clear to ausculation b/l.  No rales, rhonchi, or wheezing.  ABD: soft, non-tender, non-distended, no rebound or guarding.   EXT: Full ROM, no bony tenderness, no pedal edema, no calf tenderness  NEURO: Normal gait.  PSYCH: Cooperative, appropriate.

## 2022-10-20 ENCOUNTER — OUTPATIENT (OUTPATIENT)
Dept: OUTPATIENT SERVICES | Facility: HOSPITAL | Age: 57
LOS: 1 days | Discharge: HOME | End: 2022-10-20

## 2022-10-20 DIAGNOSIS — M54.12 RADICULOPATHY, CERVICAL REGION: ICD-10-CM

## 2022-12-13 ENCOUNTER — APPOINTMENT (OUTPATIENT)
Dept: NEUROLOGY | Facility: CLINIC | Age: 57
End: 2022-12-13

## 2023-03-17 NOTE — ED PROVIDER NOTE - CARE PROVIDER_API CALL
Assessment/Plan:   1  Sprain of left ankle, unspecified ligament, initial encounter  Reviewed patient's ED record  At this time, there is not consistency with the x-ray report stating the impression of acute fractures however the detailed report did not show any signs of fractures  Images unfortunately not available as she had this completed at [facility]   We will clarify this with radiology  At this time, she is advised to continue with her use of her crutches  She may highly benefit from seeing physical therapy as well as sports medicine  Referral was given today  She was also given an order for a cam boot to further take pressure off her ankle  At this time, she was advised to avoid any physical activity until she is cleared by sports medicine   - Ambulatory Referral to Physical Therapy; Future  - Cam Boot  - Crutches  - Ambulatory Referral to Sports Medicine; Future           There are no diagnoses linked to this encounter  Subjective:       Chief Complaint   Patient presents with   • Follow-up     Patient was seen at the ER 3/14/2023 for Left ankle sprain       Patient ID: Juan Cummins is a 16 y o  female presents today for a follow-up from the ED  She was seen in the ED on March 14  She states that she was playing volleyball when she jumped and landed on her ankle  She states that she inverted her left ankle  She had difficulty walking at that time  She did have moderate pain and swelling  She was seen in the ED and placed on crutches  She did have x-rays completed which she was informed appeared stable  HPI    Review of Systems   Constitutional: Negative for activity change, chills, fatigue and fever  HENT: Negative for congestion, ear pain, sinus pressure and sore throat  Eyes: Negative for redness, itching and visual disturbance  Respiratory: Negative for cough and shortness of breath  Cardiovascular: Negative for chest pain and palpitations  Gastrointestinal: Negative for abdominal pain, diarrhea and nausea  Endocrine: Negative for cold intolerance and heat intolerance  Genitourinary: Negative for dysuria, flank pain and frequency  Musculoskeletal: Negative for arthralgias, back pain, gait problem and myalgias  Skin: Negative for color change  Allergic/Immunologic: Negative for environmental allergies  Neurological: Negative for dizziness, numbness and headaches  Psychiatric/Behavioral: Negative for behavioral problems and sleep disturbance  The following portions of the patient's history were reviewed and updated as appropriate : past family history, past medical history, past social history and past surgical history  Current Outpatient Medications:   •  clindamycin-benzoyl peroxide (BENZACLIN) gel, Apply topically 2 (two) times a day, Disp: 25 g, Rfl: 2  •  Doxycycline Hyclate 150 MG TABS, , Disp: , Rfl:   •  levothyroxine (Synthroid) 100 mcg tablet, Take 1 tablet (100 mcg total) by mouth daily, Disp: 90 tablet, Rfl: 1         Objective:         Vitals:    03/17/23 0808   BP: 112/72   BP Location: Left arm   Patient Position: Sitting   Cuff Size: Large   Pulse: 78   Temp: 98 °F (36 7 °C)   TempSrc: Temporal   SpO2: 98%   Weight: 85 7 kg (189 lb)   Height: 5' 5 2" (1 656 m)      Physical Exam  Vitals reviewed  Constitutional:       Appearance: She is well-developed  HENT:      Head: Normocephalic and atraumatic  Nose: Nose normal       Mouth/Throat:      Pharynx: No oropharyngeal exudate  Eyes:      General: No scleral icterus  Right eye: No discharge  Left eye: No discharge  Pupils: Pupils are equal, round, and reactive to light  Neck:      Trachea: No tracheal deviation  Cardiovascular:      Rate and Rhythm: Normal rate and regular rhythm  Pulses:           Dorsalis pedis pulses are 2+ on the right side and 2+ on the left side          Posterior tibial pulses are 2+ on the right side and 2+ on the left side  Heart sounds: Normal heart sounds  No murmur heard  No friction rub  No gallop  Pulmonary:      Effort: Pulmonary effort is normal  No respiratory distress  Breath sounds: Normal breath sounds  No wheezing or rales  Abdominal:      General: Bowel sounds are normal  There is no distension  Palpations: Abdomen is soft  Tenderness: There is no abdominal tenderness  There is no guarding or rebound  Musculoskeletal:         General: Normal range of motion  Cervical back: Normal range of motion and neck supple  Lymphadenopathy:      Head:      Right side of head: No submental or submandibular adenopathy  Left side of head: No submental or submandibular adenopathy  Cervical: No cervical adenopathy  Right cervical: No superficial, deep or posterior cervical adenopathy  Left cervical: No superficial, deep or posterior cervical adenopathy  Skin:     General: Skin is warm and dry  Findings: No erythema  Neurological:      Mental Status: She is alert and oriented to person, place, and time  Cranial Nerves: No cranial nerve deficit  Sensory: No sensory deficit  Psychiatric:         Mood and Affect: Mood is not anxious or depressed  Speech: Speech normal          Behavior: Behavior normal          Thought Content:  Thought content normal          Judgment: Judgment normal  Van Mccray)  Geriatric Medicine; Internal Medicine  [address] [place] [address] [id number]  Phone: [phone]  Fax: [phone]  Follow Up Time:

## 2023-04-07 ENCOUNTER — OUTPATIENT (OUTPATIENT)
Dept: OUTPATIENT SERVICES | Facility: HOSPITAL | Age: 58
LOS: 1 days | End: 2023-04-07
Payer: COMMERCIAL

## 2023-04-07 ENCOUNTER — APPOINTMENT (OUTPATIENT)
Dept: OBGYN | Facility: CLINIC | Age: 58
End: 2023-04-07

## 2023-04-07 ENCOUNTER — APPOINTMENT (OUTPATIENT)
Dept: OBGYN | Facility: CLINIC | Age: 58
End: 2023-04-07
Payer: COMMERCIAL

## 2023-04-07 VITALS — WEIGHT: 120 LBS | BODY MASS INDEX: 23.44 KG/M2 | SYSTOLIC BLOOD PRESSURE: 120 MMHG | DIASTOLIC BLOOD PRESSURE: 60 MMHG

## 2023-04-07 DIAGNOSIS — Z01.419 ENCOUNTER FOR GYNECOLOGICAL EXAMINATION (GENERAL) (ROUTINE) W/OUT ABNORMAL FINDINGS: ICD-10-CM

## 2023-04-07 DIAGNOSIS — Z01.419 ENCOUNTER FOR GYNECOLOGICAL EXAMINATION (GENERAL) (ROUTINE) WITHOUT ABNORMAL FINDINGS: ICD-10-CM

## 2023-04-07 PROCEDURE — 99396 PREV VISIT EST AGE 40-64: CPT

## 2023-04-07 NOTE — HISTORY OF PRESENT ILLNESS
[FreeTextEntry1] :  #[id number] ([place]ero)\par \par 58yo P2, postmenopausal, PMH HTN, HLD, s/p CHELSEY for fibroids with ovaries still in place (2011), here for annual. Denies acute complaints. Denies vaginal bleeding. Denies abdominal pain, hot flashes, vaginal dryness. \par \par HCM:\par Mammo 3/2022- BIRADS 0 --> Mammo 4/22 BIRADS 4 with bening findings on right breast biopsy \par colonocopy - saw GI and was supposed to get colonoscopy last year but never went

## 2023-04-07 NOTE — DISCUSSION/SUMMARY
[FreeTextEntry1] : 56yo P2, postmenopausal, PMH HTN, HLD, s/p CHELSEY for fibroids with ovaries still in place (2011), here for annual. \par \par #HCM:\par - Mammogram screening referral ordered\par - Counseled patient on importance of screening colonoscopy and to contact GI again. \par \par RTC in 1 year for annual, earlier prn

## 2023-04-11 DIAGNOSIS — Z01.419 ENCOUNTER FOR GYNECOLOGICAL EXAMINATION (GENERAL) (ROUTINE) WITHOUT ABNORMAL FINDINGS: ICD-10-CM

## 2023-04-17 ENCOUNTER — RESULT REVIEW (OUTPATIENT)
Age: 58
End: 2023-04-17

## 2023-04-17 ENCOUNTER — OUTPATIENT (OUTPATIENT)
Dept: OUTPATIENT SERVICES | Facility: HOSPITAL | Age: 58
LOS: 1 days | End: 2023-04-17
Payer: COMMERCIAL

## 2023-04-17 DIAGNOSIS — R92.8 OTHER ABNORMAL AND INCONCLUSIVE FINDINGS ON DIAGNOSTIC IMAGING OF BREAST: ICD-10-CM

## 2023-04-17 DIAGNOSIS — Z12.31 ENCOUNTER FOR SCREENING MAMMOGRAM FOR MALIGNANT NEOPLASM OF BREAST: ICD-10-CM

## 2023-04-17 PROCEDURE — 77067 SCR MAMMO BI INCL CAD: CPT

## 2023-04-17 PROCEDURE — 77063 BREAST TOMOSYNTHESIS BI: CPT

## 2023-04-17 PROCEDURE — 77063 BREAST TOMOSYNTHESIS BI: CPT | Mod: 26

## 2023-04-17 PROCEDURE — 77067 SCR MAMMO BI INCL CAD: CPT | Mod: 26

## 2023-04-18 DIAGNOSIS — Z12.31 ENCOUNTER FOR SCREENING MAMMOGRAM FOR MALIGNANT NEOPLASM OF BREAST: ICD-10-CM

## 2023-05-29 ENCOUNTER — LABORATORY RESULT (OUTPATIENT)
Age: 58
End: 2023-05-29
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
## 2023-11-22 NOTE — PROGRESS NOTE ADULT - ATTENDING COMMENTS
Please see addendum to H&P documented today. Low Dose Naltrexone Pregnancy And Lactation Text: Naltrexone is pregnancy category C.  There have been no adequate and well-controlled studies in pregnant women.  It should be used in pregnancy only if the potential benefit justifies the potential risk to the fetus.   Limited data indicates that naltrexone is minimally excreted into breastmilk.

## 2024-04-12 ENCOUNTER — APPOINTMENT (OUTPATIENT)
Dept: OBGYN | Facility: CLINIC | Age: 59
End: 2024-04-12